# Patient Record
Sex: FEMALE | Race: ASIAN | NOT HISPANIC OR LATINO
[De-identification: names, ages, dates, MRNs, and addresses within clinical notes are randomized per-mention and may not be internally consistent; named-entity substitution may affect disease eponyms.]

---

## 2020-08-07 PROBLEM — Z00.00 ENCOUNTER FOR PREVENTIVE HEALTH EXAMINATION: Status: ACTIVE | Noted: 2020-08-07

## 2020-08-13 ENCOUNTER — APPOINTMENT (OUTPATIENT)
Dept: ANTEPARTUM | Facility: CLINIC | Age: 30
End: 2020-08-13
Payer: COMMERCIAL

## 2020-08-13 ENCOUNTER — ASOB RESULT (OUTPATIENT)
Age: 30
End: 2020-08-13

## 2020-08-13 PROCEDURE — 76811 OB US DETAILED SNGL FETUS: CPT

## 2020-08-13 PROCEDURE — 76817 TRANSVAGINAL US OBSTETRIC: CPT

## 2020-11-24 ENCOUNTER — APPOINTMENT (OUTPATIENT)
Dept: ULTRASOUND IMAGING | Facility: CLINIC | Age: 30
End: 2020-11-24

## 2020-11-24 ENCOUNTER — OUTPATIENT (OUTPATIENT)
Dept: OUTPATIENT SERVICES | Facility: HOSPITAL | Age: 30
LOS: 1 days | End: 2020-11-24
Payer: COMMERCIAL

## 2020-11-24 DIAGNOSIS — Z00.8 ENCOUNTER FOR OTHER GENERAL EXAMINATION: ICD-10-CM

## 2020-11-24 PROCEDURE — 93970 EXTREMITY STUDY: CPT

## 2020-11-24 PROCEDURE — 93970 EXTREMITY STUDY: CPT | Mod: 26

## 2020-11-26 ENCOUNTER — TRANSCRIPTION ENCOUNTER (OUTPATIENT)
Age: 30
End: 2020-11-26

## 2020-11-27 ENCOUNTER — INPATIENT (INPATIENT)
Facility: HOSPITAL | Age: 30
LOS: 4 days | Discharge: HOME | End: 2020-12-02
Attending: OBSTETRICS & GYNECOLOGY | Admitting: OBSTETRICS & GYNECOLOGY
Payer: COMMERCIAL

## 2020-11-27 VITALS
TEMPERATURE: 98 F | RESPIRATION RATE: 18 BRPM | SYSTOLIC BLOOD PRESSURE: 181 MMHG | DIASTOLIC BLOOD PRESSURE: 109 MMHG | HEART RATE: 58 BPM

## 2020-11-27 DIAGNOSIS — O26.899 OTHER SPECIFIED PREGNANCY RELATED CONDITIONS, UNSPECIFIED TRIMESTER: ICD-10-CM

## 2020-11-27 DIAGNOSIS — Z34.80 ENCOUNTER FOR SUPERVISION OF OTHER NORMAL PREGNANCY, UNSPECIFIED TRIMESTER: ICD-10-CM

## 2020-11-27 DIAGNOSIS — Z3A.00 WEEKS OF GESTATION OF PREGNANCY NOT SPECIFIED: ICD-10-CM

## 2020-11-27 DIAGNOSIS — Z98.890 OTHER SPECIFIED POSTPROCEDURAL STATES: Chronic | ICD-10-CM

## 2020-11-27 DIAGNOSIS — O03.9 COMPLETE OR UNSPECIFIED SPONTANEOUS ABORTION WITHOUT COMPLICATION: Chronic | ICD-10-CM

## 2020-11-27 LAB
ALBUMIN SERPL ELPH-MCNC: 2.7 G/DL — LOW (ref 3.3–5)
ALBUMIN SERPL ELPH-MCNC: 2.8 G/DL — LOW (ref 3.3–5)
ALP SERPL-CCNC: 148 U/L — HIGH (ref 40–120)
ALP SERPL-CCNC: 160 U/L — HIGH (ref 40–120)
ALT FLD-CCNC: 151 U/L — HIGH (ref 10–45)
ALT FLD-CCNC: 43 U/L — SIGNIFICANT CHANGE UP (ref 10–45)
ANION GAP SERPL CALC-SCNC: 11 MMOL/L — SIGNIFICANT CHANGE UP (ref 5–17)
ANION GAP SERPL CALC-SCNC: 14 MMOL/L — SIGNIFICANT CHANGE UP (ref 5–17)
APPEARANCE UR: ABNORMAL
APTT BLD: 31.6 SEC — SIGNIFICANT CHANGE UP (ref 27.5–35.5)
APTT BLD: 32.5 SEC — SIGNIFICANT CHANGE UP (ref 27.5–35.5)
AST SERPL-CCNC: 200 U/L — HIGH (ref 10–40)
AST SERPL-CCNC: 60 U/L — HIGH (ref 10–40)
BASOPHILS # BLD AUTO: 0.04 K/UL — SIGNIFICANT CHANGE UP (ref 0–0.2)
BASOPHILS # BLD AUTO: 0.04 K/UL — SIGNIFICANT CHANGE UP (ref 0–0.2)
BASOPHILS NFR BLD AUTO: 0.3 % — SIGNIFICANT CHANGE UP (ref 0–2)
BASOPHILS NFR BLD AUTO: 0.4 % — SIGNIFICANT CHANGE UP (ref 0–2)
BILIRUB SERPL-MCNC: 0.4 MG/DL — SIGNIFICANT CHANGE UP (ref 0.2–1.2)
BILIRUB SERPL-MCNC: 0.6 MG/DL — SIGNIFICANT CHANGE UP (ref 0.2–1.2)
BILIRUB UR-MCNC: NEGATIVE — SIGNIFICANT CHANGE UP
BLD GP AB SCN SERPL QL: NEGATIVE — SIGNIFICANT CHANGE UP
BUN SERPL-MCNC: 17 MG/DL — SIGNIFICANT CHANGE UP (ref 7–23)
BUN SERPL-MCNC: 17 MG/DL — SIGNIFICANT CHANGE UP (ref 7–23)
CALCIUM SERPL-MCNC: 8.5 MG/DL — SIGNIFICANT CHANGE UP (ref 8.4–10.5)
CALCIUM SERPL-MCNC: 8.9 MG/DL — SIGNIFICANT CHANGE UP (ref 8.4–10.5)
CHLORIDE SERPL-SCNC: 102 MMOL/L — SIGNIFICANT CHANGE UP (ref 96–108)
CHLORIDE SERPL-SCNC: 103 MMOL/L — SIGNIFICANT CHANGE UP (ref 96–108)
CO2 SERPL-SCNC: 19 MMOL/L — LOW (ref 22–31)
CO2 SERPL-SCNC: 20 MMOL/L — LOW (ref 22–31)
COLOR SPEC: YELLOW — SIGNIFICANT CHANGE UP
CREAT ?TM UR-MCNC: 128 MG/DL — SIGNIFICANT CHANGE UP
CREAT SERPL-MCNC: 0.79 MG/DL — SIGNIFICANT CHANGE UP (ref 0.5–1.3)
CREAT SERPL-MCNC: 0.85 MG/DL — SIGNIFICANT CHANGE UP (ref 0.5–1.3)
DIFF PNL FLD: ABNORMAL
EOSINOPHIL # BLD AUTO: 0.04 K/UL — SIGNIFICANT CHANGE UP (ref 0–0.5)
EOSINOPHIL # BLD AUTO: 0.05 K/UL — SIGNIFICANT CHANGE UP (ref 0–0.5)
EOSINOPHIL NFR BLD AUTO: 0.4 % — SIGNIFICANT CHANGE UP (ref 0–6)
EOSINOPHIL NFR BLD AUTO: 0.4 % — SIGNIFICANT CHANGE UP (ref 0–6)
FIBRINOGEN PPP-MCNC: 631 MG/DL — HIGH (ref 290–520)
FIBRINOGEN PPP-MCNC: 822 MG/DL — HIGH (ref 290–520)
GLUCOSE SERPL-MCNC: 108 MG/DL — HIGH (ref 70–99)
GLUCOSE SERPL-MCNC: 75 MG/DL — SIGNIFICANT CHANGE UP (ref 70–99)
GLUCOSE UR QL: NEGATIVE — SIGNIFICANT CHANGE UP
HCT VFR BLD CALC: 43.2 % — SIGNIFICANT CHANGE UP (ref 34.5–45)
HCT VFR BLD CALC: 46.1 % — HIGH (ref 34.5–45)
HGB BLD-MCNC: 14.3 G/DL — SIGNIFICANT CHANGE UP (ref 11.5–15.5)
HGB BLD-MCNC: 15.7 G/DL — HIGH (ref 11.5–15.5)
IMM GRANULOCYTES NFR BLD AUTO: 0.4 % — SIGNIFICANT CHANGE UP (ref 0–1.5)
IMM GRANULOCYTES NFR BLD AUTO: 0.6 % — SIGNIFICANT CHANGE UP (ref 0–1.5)
INR BLD: 0.75 RATIO — LOW (ref 0.88–1.16)
INR BLD: 0.75 RATIO — LOW (ref 0.88–1.16)
KETONES UR-MCNC: NEGATIVE — SIGNIFICANT CHANGE UP
LDH SERPL L TO P-CCNC: 436 U/L — HIGH (ref 50–242)
LDH SERPL L TO P-CCNC: 534 U/L — HIGH (ref 50–242)
LEUKOCYTE ESTERASE UR-ACNC: NEGATIVE — SIGNIFICANT CHANGE UP
LYMPHOCYTES # BLD AUTO: 1.54 K/UL — SIGNIFICANT CHANGE UP (ref 1–3.3)
LYMPHOCYTES # BLD AUTO: 12.2 % — LOW (ref 13–44)
LYMPHOCYTES # BLD AUTO: 3.46 K/UL — HIGH (ref 1–3.3)
LYMPHOCYTES # BLD AUTO: 33 % — SIGNIFICANT CHANGE UP (ref 13–44)
MAGNESIUM SERPL-MCNC: 5 MG/DL — HIGH (ref 1.6–2.6)
MCHC RBC-ENTMCNC: 31.8 PG — SIGNIFICANT CHANGE UP (ref 27–34)
MCHC RBC-ENTMCNC: 32.7 PG — SIGNIFICANT CHANGE UP (ref 27–34)
MCHC RBC-ENTMCNC: 33.1 GM/DL — SIGNIFICANT CHANGE UP (ref 32–36)
MCHC RBC-ENTMCNC: 34.1 GM/DL — SIGNIFICANT CHANGE UP (ref 32–36)
MCV RBC AUTO: 96 FL — SIGNIFICANT CHANGE UP (ref 80–100)
MCV RBC AUTO: 96.2 FL — SIGNIFICANT CHANGE UP (ref 80–100)
MONOCYTES # BLD AUTO: 0.22 K/UL — SIGNIFICANT CHANGE UP (ref 0–0.9)
MONOCYTES # BLD AUTO: 0.75 K/UL — SIGNIFICANT CHANGE UP (ref 0–0.9)
MONOCYTES NFR BLD AUTO: 1.7 % — LOW (ref 2–14)
MONOCYTES NFR BLD AUTO: 7.1 % — SIGNIFICANT CHANGE UP (ref 2–14)
NEUTROPHILS # BLD AUTO: 10.75 K/UL — HIGH (ref 1.8–7.4)
NEUTROPHILS # BLD AUTO: 6.16 K/UL — SIGNIFICANT CHANGE UP (ref 1.8–7.4)
NEUTROPHILS NFR BLD AUTO: 58.7 % — SIGNIFICANT CHANGE UP (ref 43–77)
NEUTROPHILS NFR BLD AUTO: 84.8 % — HIGH (ref 43–77)
NITRITE UR-MCNC: NEGATIVE — SIGNIFICANT CHANGE UP
NRBC # BLD: 0 /100 WBCS — SIGNIFICANT CHANGE UP (ref 0–0)
NRBC # BLD: 0 /100 WBCS — SIGNIFICANT CHANGE UP (ref 0–0)
PH UR: 6.5 — SIGNIFICANT CHANGE UP (ref 5–8)
PLATELET # BLD AUTO: 108 K/UL — LOW (ref 150–400)
PLATELET # BLD AUTO: 156 K/UL — SIGNIFICANT CHANGE UP (ref 150–400)
POTASSIUM SERPL-MCNC: 4.9 MMOL/L — SIGNIFICANT CHANGE UP (ref 3.5–5.3)
POTASSIUM SERPL-MCNC: 4.9 MMOL/L — SIGNIFICANT CHANGE UP (ref 3.5–5.3)
POTASSIUM SERPL-SCNC: 4.9 MMOL/L — SIGNIFICANT CHANGE UP (ref 3.5–5.3)
POTASSIUM SERPL-SCNC: 4.9 MMOL/L — SIGNIFICANT CHANGE UP (ref 3.5–5.3)
PROT ?TM UR-MCNC: >2000 MG/DL — HIGH (ref 0–12)
PROT SERPL-MCNC: 5.6 G/DL — LOW (ref 6–8.3)
PROT SERPL-MCNC: 6.3 G/DL — SIGNIFICANT CHANGE UP (ref 6–8.3)
PROT UR-MCNC: >600
PROT/CREAT UR-RTO: SIGNIFICANT CHANGE UP (ref 0–0.2)
PROTHROM AB SERPL-ACNC: 9.1 SEC — LOW (ref 10.6–13.6)
PROTHROM AB SERPL-ACNC: 9.1 SEC — LOW (ref 10.6–13.6)
RBC # BLD: 4.49 M/UL — SIGNIFICANT CHANGE UP (ref 3.8–5.2)
RBC # BLD: 4.8 M/UL — SIGNIFICANT CHANGE UP (ref 3.8–5.2)
RBC # FLD: 14.2 % — SIGNIFICANT CHANGE UP (ref 10.3–14.5)
RBC # FLD: 14.3 % — SIGNIFICANT CHANGE UP (ref 10.3–14.5)
RH IG SCN BLD-IMP: POSITIVE — SIGNIFICANT CHANGE UP
SARS-COV-2 IGG SERPL QL IA: NEGATIVE — SIGNIFICANT CHANGE UP
SARS-COV-2 IGM SERPL IA-ACNC: <0.1 INDEX — SIGNIFICANT CHANGE UP
SARS-COV-2 RNA SPEC QL NAA+PROBE: SIGNIFICANT CHANGE UP
SODIUM SERPL-SCNC: 133 MMOL/L — LOW (ref 135–145)
SODIUM SERPL-SCNC: 136 MMOL/L — SIGNIFICANT CHANGE UP (ref 135–145)
SP GR SPEC: 1.03 — HIGH (ref 1.01–1.02)
T PALLIDUM AB TITR SER: NEGATIVE — SIGNIFICANT CHANGE UP
URATE SERPL-MCNC: 7.4 MG/DL — HIGH (ref 2.5–7)
URATE SERPL-MCNC: 7.5 MG/DL — HIGH (ref 2.5–7)
UROBILINOGEN FLD QL: NEGATIVE — SIGNIFICANT CHANGE UP
WBC # BLD: 10.49 K/UL — SIGNIFICANT CHANGE UP (ref 3.8–10.5)
WBC # BLD: 12.67 K/UL — HIGH (ref 3.8–10.5)
WBC # FLD AUTO: 10.49 K/UL — SIGNIFICANT CHANGE UP (ref 3.8–10.5)
WBC # FLD AUTO: 12.67 K/UL — HIGH (ref 3.8–10.5)

## 2020-11-27 PROCEDURE — 99233 SBSQ HOSP IP/OBS HIGH 50: CPT

## 2020-11-27 PROCEDURE — 88307 TISSUE EXAM BY PATHOLOGIST: CPT | Mod: 26

## 2020-11-27 RX ORDER — HYDRALAZINE HCL 50 MG
10 TABLET ORAL ONCE
Refills: 0 | Status: COMPLETED | OUTPATIENT
Start: 2020-11-27 | End: 2020-11-27

## 2020-11-27 RX ORDER — FERROUS SULFATE 325(65) MG
325 TABLET ORAL DAILY
Refills: 0 | Status: DISCONTINUED | OUTPATIENT
Start: 2020-11-27 | End: 2020-12-02

## 2020-11-27 RX ORDER — HYDRALAZINE HCL 50 MG
10 TABLET ORAL ONCE
Refills: 0 | Status: DISCONTINUED | OUTPATIENT
Start: 2020-11-27 | End: 2020-11-28

## 2020-11-27 RX ORDER — LABETALOL HCL 100 MG
20 TABLET ORAL ONCE
Refills: 0 | Status: COMPLETED | OUTPATIENT
Start: 2020-11-27 | End: 2020-11-27

## 2020-11-27 RX ORDER — MAGNESIUM SULFATE 500 MG/ML
2 VIAL (ML) INJECTION
Qty: 40 | Refills: 0 | Status: DISCONTINUED | OUTPATIENT
Start: 2020-11-27 | End: 2020-11-27

## 2020-11-27 RX ORDER — OXYCODONE HYDROCHLORIDE 5 MG/1
5 TABLET ORAL ONCE
Refills: 0 | Status: DISCONTINUED | OUTPATIENT
Start: 2020-11-27 | End: 2020-11-27

## 2020-11-27 RX ORDER — LABETALOL HCL 100 MG
300 TABLET ORAL THREE TIMES A DAY
Refills: 0 | Status: DISCONTINUED | OUTPATIENT
Start: 2020-11-27 | End: 2020-11-27

## 2020-11-27 RX ORDER — OXYTOCIN 10 UNIT/ML
333.33 VIAL (ML) INJECTION
Qty: 20 | Refills: 0 | Status: DISCONTINUED | OUTPATIENT
Start: 2020-11-27 | End: 2020-11-27

## 2020-11-27 RX ORDER — OXYCODONE HYDROCHLORIDE 5 MG/1
5 TABLET ORAL
Refills: 0 | Status: DISCONTINUED | OUTPATIENT
Start: 2020-11-27 | End: 2020-11-27

## 2020-11-27 RX ORDER — SIMETHICONE 80 MG/1
80 TABLET, CHEWABLE ORAL EVERY 4 HOURS
Refills: 0 | Status: DISCONTINUED | OUTPATIENT
Start: 2020-11-27 | End: 2020-12-02

## 2020-11-27 RX ORDER — CITRIC ACID/SODIUM CITRATE 300-500 MG
15 SOLUTION, ORAL ORAL EVERY 6 HOURS
Refills: 0 | Status: DISCONTINUED | OUTPATIENT
Start: 2020-11-27 | End: 2020-11-27

## 2020-11-27 RX ORDER — SODIUM CHLORIDE 9 MG/ML
1000 INJECTION, SOLUTION INTRAVENOUS
Refills: 0 | Status: DISCONTINUED | OUTPATIENT
Start: 2020-11-27 | End: 2020-11-27

## 2020-11-27 RX ORDER — ONDANSETRON 8 MG/1
4 TABLET, FILM COATED ORAL ONCE
Refills: 0 | Status: COMPLETED | OUTPATIENT
Start: 2020-11-27 | End: 2020-11-27

## 2020-11-27 RX ORDER — TETANUS TOXOID, REDUCED DIPHTHERIA TOXOID AND ACELLULAR PERTUSSIS VACCINE, ADSORBED 5; 2.5; 8; 8; 2.5 [IU]/.5ML; [IU]/.5ML; UG/.5ML; UG/.5ML; UG/.5ML
0.5 SUSPENSION INTRAMUSCULAR ONCE
Refills: 0 | Status: DISCONTINUED | OUTPATIENT
Start: 2020-11-27 | End: 2020-11-27

## 2020-11-27 RX ORDER — CHLORHEXIDINE GLUCONATE 213 G/1000ML
1 SOLUTION TOPICAL
Refills: 0 | Status: DISCONTINUED | OUTPATIENT
Start: 2020-11-28 | End: 2020-11-28

## 2020-11-27 RX ORDER — SODIUM CHLORIDE 9 MG/ML
1000 INJECTION, SOLUTION INTRAVENOUS
Refills: 0 | Status: DISCONTINUED | OUTPATIENT
Start: 2020-11-27 | End: 2020-11-28

## 2020-11-27 RX ORDER — MORPHINE SULFATE 50 MG/1
2 CAPSULE, EXTENDED RELEASE ORAL ONCE
Refills: 0 | Status: DISCONTINUED | OUTPATIENT
Start: 2020-11-27 | End: 2020-11-27

## 2020-11-27 RX ORDER — LABETALOL HCL 100 MG
40 TABLET ORAL ONCE
Refills: 0 | Status: COMPLETED | OUTPATIENT
Start: 2020-11-27 | End: 2020-11-27

## 2020-11-27 RX ORDER — MAGNESIUM SULFATE 500 MG/ML
4 VIAL (ML) INJECTION ONCE
Refills: 0 | Status: DISCONTINUED | OUTPATIENT
Start: 2020-11-27 | End: 2020-11-27

## 2020-11-27 RX ORDER — DIPHENHYDRAMINE HCL 50 MG
25 CAPSULE ORAL EVERY 6 HOURS
Refills: 0 | Status: DISCONTINUED | OUTPATIENT
Start: 2020-11-27 | End: 2020-11-27

## 2020-11-27 RX ORDER — NIFEDIPINE 30 MG
10 TABLET, EXTENDED RELEASE 24 HR ORAL ONCE
Refills: 0 | Status: COMPLETED | OUTPATIENT
Start: 2020-11-27 | End: 2020-11-27

## 2020-11-27 RX ORDER — NIFEDIPINE 30 MG
60 TABLET, EXTENDED RELEASE 24 HR ORAL DAILY
Refills: 0 | Status: DISCONTINUED | OUTPATIENT
Start: 2020-11-27 | End: 2020-11-28

## 2020-11-27 RX ORDER — FOLIC ACID 0.8 MG
1 TABLET ORAL DAILY
Refills: 0 | Status: DISCONTINUED | OUTPATIENT
Start: 2020-11-27 | End: 2020-11-27

## 2020-11-27 RX ORDER — MAGNESIUM SULFATE 500 MG/ML
4 VIAL (ML) INJECTION ONCE
Refills: 0 | Status: COMPLETED | OUTPATIENT
Start: 2020-11-27 | End: 2020-11-27

## 2020-11-27 RX ORDER — NALOXONE HYDROCHLORIDE 4 MG/.1ML
0.1 SPRAY NASAL
Refills: 0 | Status: DISCONTINUED | OUTPATIENT
Start: 2020-11-27 | End: 2020-12-02

## 2020-11-27 RX ORDER — ONDANSETRON 8 MG/1
4 TABLET, FILM COATED ORAL EVERY 6 HOURS
Refills: 0 | Status: DISCONTINUED | OUTPATIENT
Start: 2020-11-27 | End: 2020-11-27

## 2020-11-27 RX ORDER — LABETALOL HCL 100 MG
80 TABLET ORAL ONCE
Refills: 0 | Status: COMPLETED | OUTPATIENT
Start: 2020-11-27 | End: 2020-11-27

## 2020-11-27 RX ORDER — MAGNESIUM HYDROXIDE 400 MG/1
30 TABLET, CHEWABLE ORAL
Refills: 0 | Status: DISCONTINUED | OUTPATIENT
Start: 2020-11-27 | End: 2020-12-02

## 2020-11-27 RX ORDER — ALPRAZOLAM 0.25 MG
0.5 TABLET ORAL ONCE
Refills: 0 | Status: DISCONTINUED | OUTPATIENT
Start: 2020-11-27 | End: 2020-11-27

## 2020-11-27 RX ORDER — LANOLIN
1 OINTMENT (GRAM) TOPICAL EVERY 6 HOURS
Refills: 0 | Status: DISCONTINUED | OUTPATIENT
Start: 2020-11-27 | End: 2020-12-02

## 2020-11-27 RX ADMIN — Medication 12 MILLIGRAM(S): at 15:33

## 2020-11-27 RX ADMIN — Medication 200 GRAM(S): at 14:22

## 2020-11-27 RX ADMIN — Medication 80 MILLIGRAM(S): at 16:48

## 2020-11-27 RX ADMIN — Medication 10 MILLIGRAM(S): at 13:56

## 2020-11-27 RX ADMIN — Medication 300 MILLIGRAM(S): at 15:33

## 2020-11-27 RX ADMIN — Medication 20 MILLIGRAM(S): at 14:24

## 2020-11-27 RX ADMIN — Medication 50 GM/HR: at 14:52

## 2020-11-27 RX ADMIN — SODIUM CHLORIDE 50 MILLILITER(S): 9 INJECTION, SOLUTION INTRAVENOUS at 14:14

## 2020-11-27 RX ADMIN — Medication 10 MILLIGRAM(S): at 22:32

## 2020-11-27 RX ADMIN — Medication 10 MILLIGRAM(S): at 13:39

## 2020-11-27 RX ADMIN — ONDANSETRON 4 MILLIGRAM(S): 8 TABLET, FILM COATED ORAL at 17:00

## 2020-11-27 RX ADMIN — Medication 40 MILLIGRAM(S): at 15:27

## 2020-11-27 RX ADMIN — Medication 80 MILLIGRAM(S): at 15:53

## 2020-11-27 NOTE — OB PROVIDER H&P - ATTENDING COMMENTS
Patient is a P0 at 35w3d presenting with severe range BPs and meeting criteria for Pre-eclampsia with severe features.  P:C ratio in the office 2 weeks ago was 9. Patient also edematous.  After multiple attempts at obtaining IV, IV was inserted. In the interim, patient received Procardia PO 10 and 20MG. After IV was inserted, patient also received Labetalol 20 IVP. Will start Magnesium and Labetalol 300 TID. Will plan for IOL with PO cytotec and CB. Discussed risks of  if pressures continue to be uncontrolled.    All RBAS discussed    huy muniz

## 2020-11-27 NOTE — CHART NOTE - NSCHARTNOTEFT_GEN_A_CORE
Patient's BPs now controlled after multiple pushes of IV labetalol. Cat 1 tracing. Patient now getting Magnesium for seizure ppx.    Will induce with PO cytotec and CB  huy muniz Patient's BPs now controlled after multiple pushes of IV labetalol. Cat 1 tracing. Patient now getting Magnesium for seizure ppx.    Reviewed HELLP Labs               14.3   12.67 )-----------( 108      (  @ 18:32 )             43.2      18:32    133  |  102  |  17  ----------------------------<  108  4.9   |  20  |  0.85    Ca    8.5       @ 18:32  Mg     5.0      @ 18:32    TPro  5.6  /  Alb  2.7  /  TBili  0.6  /  DBili  x   /  AST  200  /  ALT  151  /  AlkPhos  148   @ 18:32    PT/INR - (  @ 18:32 )   PT: 9.1 sec;   INR: 0.75 ratio    PTT - (  @ 18:32 )  PTT:31.6 sec    Uric Acid: ( @ 18:32)  7.5      Fibrinogen: ( @ 18:32)  631      LDH: ( @ 18:32)  534        Given rapidely rising LFTS and dropping plts, plan for .   Discussed RBAS with patient and her   Given high hemorrhage risk will have hemorrhage kit in room and prophylactically given TXA    huy muniz

## 2020-11-27 NOTE — OB RN DELIVERY SUMMARY - NS_SEPSISRSKCALC_OBGYN_ALL_OB_FT
Rupture of membranes must be entered above.  GBS status in the 'Prenatal Lab tests/results section' on the OB RN Patient Profile must be documented.

## 2020-11-27 NOTE — OB PROVIDER H&P - HISTORY OF PRESENT ILLNESS
29yo P0 @ 35w 3 d presents from the office with elevated BP for R/O PEC, pt with new onset elevated BP in the office today 150/90.  Pt denies HA, visual changes or epigastric/RUQ pain.  Pt does states that she has alot of LE edema and also swelling of her abdomen and was told she had fluid on ultrasound outside the uterus.  Pt denies contractions, VB or LOF has + FM    PNC: Dr Michelle  PNI: uncomplicated  PNL: GBS ?    All: NKDA  Meds: PNV  PMHx: Denies   PSHx: Denies  Socialhx: denies x 3 ( quit tobacco prior o pregnancy)  OBhx: 2014 1st TOP --> D & C  GYNhx: Denies fibroids , ov cyst or STDs      HR: 63 (11-27-20 @ 13:39) (58 - 66)  BP: 167/114 (11-27-20 @ 13:24) (167/114 - 181/109)  SpO2: 100% (11-27-20 @ 13:39) (98% - 100%)    Gen: NAD  Heart: RRR  LUngs: CT A 29yo  @ 35w 3 d presents from the office with elevated BP for R/O PEC, pt with new onset elevated BP in the office today 150/90.  Pt denies HA, visual changes or epigastric/RUQ pain.  Pt does states that she has alot of LE edema and also swelling of her abdomen and was told she had fluid on ultrasound outside the uterus.  Pt denies contractions, VB or LOF has + FM    PNC: Dr Michelle  PNI: uncomplicated  PNL: GBS ?, PC/C ratio 9.2 ( 11/21)  Prenatal US: 11/20  EFW 3lbs 15 oz ( 2%), AC <1%, KAYLI 10  11/7: vtx KAYLI 13  All: NKDA  Meds: PNV  PMHx: Denies   PSHx: D&C  Socialhx: denies x 3 ( quit tobacco prior o pregnancy)  OBhx: 2014 1st TOP --> D & C  GYNhx: Denies fibroids , ov cyst or STDs      HR: 63 (11-27-20 @ 13:39) (58 - 66)  BP: 167/114 (11-27-20 @ 13:24) (167/114 - 181/109)  SpO2: 100% (11-27-20 @ 13:39) (98% - 100%)    Gen: NAD  Heart: RRR  Lungs: CTA B/L  Abdomen: Gravid, NT, + swelling of lower abdomen  Ext: 3 + b/l LE edema, 3+ brisks reflexes    NST: 140's moderate variability + accels no decels  TOCO: none  VE:  EFW: 3lbs 15 oz 11/20  BSUS: vtx in office today 29yo  @ 35w 3 d presents from the office with elevated BP for R/O PEC, pt with new onset elevated BP in the office today 150/90.  Pt denies HA, visual changes or epigastric/RUQ pain.  Pt does states that she has alot of LE edema and also swelling of her abdomen and was told she had fluid on ultrasound outside the uterus.  Pt denies contractions, VB or LOF has + FM  15lb weight gain in < 1 week    PNC: Dr Michelle  PNI: uncomplicated  PNL: GBS ?, PC/C ratio 9.2 ( 11/21)  Prenatal US: 11/20  EFW 3lbs 15 oz ( 2%), AC <1%, KAYLI 10  11/7: vtx KAYLI 13  All: NKDA  Meds: PNV  PMHx: Denies   PSHx: D&C  Socialhx: denies x 3 ( quit tobacco prior o pregnancy)  OBhx: 2014 1st TOP --> D & C  GYNhx: Denies fibroids , ov cyst or STDs      HR: 63 (11-27-20 @ 13:39) (58 - 66)  BP: 167/114 (11-27-20 @ 13:24) (167/114 - 181/109)  SpO2: 100% (11-27-20 @ 13:39) (98% - 100%)    Gen: NAD  Heart: RRR  Lungs: CTA B/L  Abdomen: Gravid, NT, + swelling of lower abdomen  Ext: 3 + b/l LE edema, 3+ brisks reflexes    NST: 140's moderate variability + accels no decels  TOCO: none  VE:deferred  EFW: 3lbs 15 oz 11/20  BSUS: vtx in office today

## 2020-11-27 NOTE — OB PROVIDER H&P - ASSESSMENT
29yo  @ 29yo  @ 35w 3 d admitted with sPEC  - Admit to L & D/labs/IVF/NPO  - Fetal status - cat 1  - GBS ?  - Pain control as needed  - HELLP labs ordered  - Betamethasone ordered  - Magnesium for seizure prophylaxis ordered  - Unable to get IV access Nifedipeine ordered PO   D/W Dr Michelle who is at the bedside evaluating patient with me    Carmen ORTEGA_LENORE

## 2020-11-27 NOTE — CHART NOTE - NSCHARTNOTEFT_GEN_A_CORE
R4 OB Chart Note    29yo  at 35w3d presented with elevated BPs and proteinuria, found to be severe PEC in triage. Patient required Procardia 10IR x2 prior to IV and subsequently received labetalol 20, 40, 80 and 80mg of IVP medication for persistent severe range BPs. She also received Labetalol 300mg PO x1. HELLP labs on arrival were stable, however, repeat labs at 1812 showed platelets downtrending to 108 and AST/ALT rising to 200/151. Decision made to proceed w/ C/S for concern for developing HELLP syndrome.     C/S uncomplicated, EBL 800cc, IVF 2400cc, UOP 75cc (concentrated). Patient given TXA prophylactically at start of procedure. At end of procedure, patient's BPs noted to be 160s/100s and anesthesia administered cardene. Patient arrived to PACU stable with BPs 150s/90s via left A-line placed intraop. Patient began to have severe range BPs again and hydralazine 10mg IVP given x1. SICU consult called due to patient's continued severe range BPs requiring higher level of care. Patient ordered for Procardia 60XL to be started as standing medication with labetalol 300mg d/c in setting of asthma history. Epidural remains in place, as would avoid toradol and tylenol due to asthma and PPH risk, along with rising LFTs. Will plan for repeat HELLP labs at 1230a.     Patient and  aware of plan and all questions addressed.     Dr. Miguel Angel farley   L Kramer PGY4

## 2020-11-27 NOTE — OB NEONATOLOGY/PEDIATRICIAN DELIVERY SUMMARY - NSPEDSNEONOTESA_OBGYN_ALL_OB_FT
Baby Demond is a 35+2 wk GA male born to a 31 y/o  mother via C/S for preeclampsia on magnesium. Maternal history of asthma as a child. Prenatal history complicated by high BP and elevated liver enzymes. Magnesium level is 5.0. Maternal BT o+. PNL neg, NR, and immune. GBS unknown. ROM at c/s, clear fluids. Baby born vigorous and crying spontaneously. WDSS. Apgars 8/9. EOS 0.16. Mom plans to breastfeed, would like hepB and circ . COVID status negative. Baby’s temp in delivery room: 36.2. Was transferred to NICU on RA.

## 2020-11-27 NOTE — OB PROVIDER IHI INDUCTION/AUGMENTATION NOTE - LABOR: CERVICAL CONSISTENCY
Relevant Problems   No relevant active problems       Anesthetic History     PONV          Review of Systems / Medical History  Patient summary reviewed, nursing notes reviewed and pertinent labs reviewed    Pulmonary                   Neuro/Psych              Cardiovascular    Hypertension: well controlled              Exercise tolerance: >4 METS     GI/Hepatic/Renal     GERD: well controlled    Renal disease: CRI       Endo/Other      Hypothyroidism: well controlled  Obesity, arthritis and cancer (breast)     Other Findings              Physical Exam    Airway  Mallampati: II  TM Distance: 4 - 6 cm  Neck ROM: normal range of motion   Mouth opening: Normal     Cardiovascular  Regular rate and rhythm,  S1 and S2 normal,  no murmur, click, rub, or gallop             Dental    Dentition: Full lower dentures and Full upper dentures     Pulmonary  Breath sounds clear to auscultation               Abdominal         Other Findings            Anesthetic Plan    ASA: 2  Anesthesia type: total IV anesthesia          Induction: Intravenous  Anesthetic plan and risks discussed with: Patient medium

## 2020-11-27 NOTE — OB PROVIDER LABOR PROGRESS NOTE - ASSESSMENT
31yo P0 @ 35w 3 d admitted for sPEC, with severe range BPs    Pt is now s/p multiple doses of medication to try and control BP's  Nifedipine 10mg PO--> repeat /114  NIfedipine 20mg PO --> repeat /97  Labetalol 20mg IVP --> repeat /83, 183/104  Labetalol 40mg IVP --> repeat /99  Labetalol 80mg IVP --> anestheisa consulted, MFM consulted  Labetalol 300mg PO -->repeat /91, epidural placed -->178/106  Labetalol 80mg IVP --> 151/95, 138/87    BP's now controlled  Pt sleeping in the bed and asyptomatic  Plan is to continue magnesium for seizure prophylaxis  Will continue to monitor BPs and urine output  Will send repeat HELLP labs, will collect 24 hour urine  Will do cervical exam to determine induction agent  D/W Dr Miguel Angel Peña PA-C

## 2020-11-27 NOTE — OB RN INTRAOPERATIVE NOTE - NS_DELIVERYRN_OBGYN_ALL_OB_FT
Problem: Infection  Goal: Will remain free from infection    Intervention: Assess signs and symptoms of infection  Patient at risk for infection due to invasive procedures. Given teaching on importance of hand hygiene and infection prevention techniques.       Problem: Knowledge Deficit  Goal: Knowledge of disease process/condition, treatment plan, diagnostic tests, and medications will improve    Intervention: Assess knowledge level of disease process/condition, treatment plan, diagnostic tests, and medications  Plan of care explained to patient along with disease process and complications. Patient refused teaching          Lor DIAZ

## 2020-11-27 NOTE — OB PROVIDER H&P - NSHPPHYSICALEXAM_GEN_ALL_CORE
Gen: NAD  Heart: RRR  Lungs: CTA B/L  Abdomen: Gravid, NT, + swelling of lower abdomen  Ext: 3 + b/l LE edema, 3+ brisks reflexes

## 2020-11-27 NOTE — OB PROVIDER DELIVERY SUMMARY - NSPROVIDERDELIVERYNOTE_OBGYN_ALL_OB_FT
Liveborn infant  Normal appearing uterus, bilateral tubes and ovaries  Placenta was small with multiple areas of white plaques  Uterus closed in 2 layers  Intercede placed over hysterotomy

## 2020-11-28 LAB
ALBUMIN SERPL ELPH-MCNC: 2.1 G/DL — LOW (ref 3.3–5)
ALBUMIN SERPL ELPH-MCNC: 2.3 G/DL — LOW (ref 3.3–5)
ALBUMIN SERPL ELPH-MCNC: 2.3 G/DL — LOW (ref 3.3–5)
ALBUMIN SERPL ELPH-MCNC: 2.5 G/DL — LOW (ref 3.3–5)
ALBUMIN SERPL ELPH-MCNC: 2.5 G/DL — LOW (ref 3.3–5)
ALP SERPL-CCNC: 107 U/L — SIGNIFICANT CHANGE UP (ref 40–120)
ALP SERPL-CCNC: 116 U/L — SIGNIFICANT CHANGE UP (ref 40–120)
ALP SERPL-CCNC: 119 U/L — SIGNIFICANT CHANGE UP (ref 40–120)
ALP SERPL-CCNC: 133 U/L — HIGH (ref 40–120)
ALP SERPL-CCNC: 138 U/L — HIGH (ref 40–120)
ALT FLD-CCNC: 120 U/L — HIGH (ref 10–45)
ALT FLD-CCNC: 133 U/L — HIGH (ref 10–45)
ALT FLD-CCNC: 77 U/L — HIGH (ref 10–45)
ALT FLD-CCNC: 92 U/L — HIGH (ref 10–45)
ALT FLD-CCNC: 96 U/L — HIGH (ref 10–45)
ANION GAP SERPL CALC-SCNC: 10 MMOL/L — SIGNIFICANT CHANGE UP (ref 5–17)
ANION GAP SERPL CALC-SCNC: 13 MMOL/L — SIGNIFICANT CHANGE UP (ref 5–17)
ANION GAP SERPL CALC-SCNC: 14 MMOL/L — SIGNIFICANT CHANGE UP (ref 5–17)
ANION GAP SERPL CALC-SCNC: 15 MMOL/L — SIGNIFICANT CHANGE UP (ref 5–17)
ANION GAP SERPL CALC-SCNC: 15 MMOL/L — SIGNIFICANT CHANGE UP (ref 5–17)
APTT BLD: 30.4 SEC — SIGNIFICANT CHANGE UP (ref 27.5–35.5)
AST SERPL-CCNC: 107 U/L — HIGH (ref 10–40)
AST SERPL-CCNC: 131 U/L — HIGH (ref 10–40)
AST SERPL-CCNC: 150 U/L — HIGH (ref 10–40)
AST SERPL-CCNC: 197 U/L — HIGH (ref 10–40)
AST SERPL-CCNC: 215 U/L — HIGH (ref 10–40)
BILIRUB SERPL-MCNC: 0.5 MG/DL — SIGNIFICANT CHANGE UP (ref 0.2–1.2)
BILIRUB SERPL-MCNC: 0.6 MG/DL — SIGNIFICANT CHANGE UP (ref 0.2–1.2)
BUN SERPL-MCNC: 18 MG/DL — SIGNIFICANT CHANGE UP (ref 7–23)
BUN SERPL-MCNC: 20 MG/DL — SIGNIFICANT CHANGE UP (ref 7–23)
BUN SERPL-MCNC: 20 MG/DL — SIGNIFICANT CHANGE UP (ref 7–23)
CALCIUM SERPL-MCNC: 7.6 MG/DL — LOW (ref 8.4–10.5)
CALCIUM SERPL-MCNC: 7.9 MG/DL — LOW (ref 8.4–10.5)
CALCIUM SERPL-MCNC: 7.9 MG/DL — LOW (ref 8.4–10.5)
CALCIUM SERPL-MCNC: 8.1 MG/DL — LOW (ref 8.4–10.5)
CALCIUM SERPL-MCNC: 8.7 MG/DL — SIGNIFICANT CHANGE UP (ref 8.4–10.5)
CHLORIDE SERPL-SCNC: 101 MMOL/L — SIGNIFICANT CHANGE UP (ref 96–108)
CHLORIDE SERPL-SCNC: 101 MMOL/L — SIGNIFICANT CHANGE UP (ref 96–108)
CHLORIDE SERPL-SCNC: 102 MMOL/L — SIGNIFICANT CHANGE UP (ref 96–108)
CHLORIDE SERPL-SCNC: 102 MMOL/L — SIGNIFICANT CHANGE UP (ref 96–108)
CHLORIDE SERPL-SCNC: 103 MMOL/L — SIGNIFICANT CHANGE UP (ref 96–108)
CO2 SERPL-SCNC: 15 MMOL/L — LOW (ref 22–31)
CO2 SERPL-SCNC: 15 MMOL/L — LOW (ref 22–31)
CO2 SERPL-SCNC: 17 MMOL/L — LOW (ref 22–31)
CO2 SERPL-SCNC: 18 MMOL/L — LOW (ref 22–31)
CO2 SERPL-SCNC: 19 MMOL/L — LOW (ref 22–31)
CREAT SERPL-MCNC: 0.77 MG/DL — SIGNIFICANT CHANGE UP (ref 0.5–1.3)
CREAT SERPL-MCNC: 0.83 MG/DL — SIGNIFICANT CHANGE UP (ref 0.5–1.3)
CREAT SERPL-MCNC: 0.84 MG/DL — SIGNIFICANT CHANGE UP (ref 0.5–1.3)
CREAT SERPL-MCNC: 0.87 MG/DL — SIGNIFICANT CHANGE UP (ref 0.5–1.3)
CREAT SERPL-MCNC: 0.88 MG/DL — SIGNIFICANT CHANGE UP (ref 0.5–1.3)
FIBRINOGEN PPP-MCNC: 617 MG/DL — HIGH (ref 290–520)
GAS PNL BLDA: SIGNIFICANT CHANGE UP
GLUCOSE SERPL-MCNC: 121 MG/DL — HIGH (ref 70–99)
GLUCOSE SERPL-MCNC: 128 MG/DL — HIGH (ref 70–99)
GLUCOSE SERPL-MCNC: 133 MG/DL — HIGH (ref 70–99)
GLUCOSE SERPL-MCNC: 134 MG/DL — HIGH (ref 70–99)
GLUCOSE SERPL-MCNC: 140 MG/DL — HIGH (ref 70–99)
HCT VFR BLD CALC: 34.8 % — SIGNIFICANT CHANGE UP (ref 34.5–45)
HCT VFR BLD CALC: 36.5 % — SIGNIFICANT CHANGE UP (ref 34.5–45)
HCT VFR BLD CALC: 37.4 % — SIGNIFICANT CHANGE UP (ref 34.5–45)
HCT VFR BLD CALC: 41.7 % — SIGNIFICANT CHANGE UP (ref 34.5–45)
HCT VFR BLD CALC: 41.7 % — SIGNIFICANT CHANGE UP (ref 34.5–45)
HGB BLD-MCNC: 11.8 G/DL — SIGNIFICANT CHANGE UP (ref 11.5–15.5)
HGB BLD-MCNC: 12.8 G/DL — SIGNIFICANT CHANGE UP (ref 11.5–15.5)
HGB BLD-MCNC: 13.1 G/DL — SIGNIFICANT CHANGE UP (ref 11.5–15.5)
HGB BLD-MCNC: 13.9 G/DL — SIGNIFICANT CHANGE UP (ref 11.5–15.5)
HGB BLD-MCNC: 14.5 G/DL — SIGNIFICANT CHANGE UP (ref 11.5–15.5)
INR BLD: 0.77 RATIO — LOW (ref 0.88–1.16)
LDH SERPL L TO P-CCNC: 540 U/L — HIGH (ref 50–242)
LDH SERPL L TO P-CCNC: 596 U/L — HIGH (ref 50–242)
MAGNESIUM SERPL-MCNC: 5.5 MG/DL — HIGH (ref 1.6–2.6)
MAGNESIUM SERPL-MCNC: 6.5 MG/DL — HIGH (ref 1.6–2.6)
MAGNESIUM SERPL-MCNC: 6.6 MG/DL — HIGH (ref 1.6–2.6)
MAGNESIUM SERPL-MCNC: 6.7 MG/DL — HIGH (ref 1.6–2.6)
MAGNESIUM SERPL-MCNC: 6.7 MG/DL — HIGH (ref 1.6–2.6)
MCHC RBC-ENTMCNC: 31.8 PG — SIGNIFICANT CHANGE UP (ref 27–34)
MCHC RBC-ENTMCNC: 32.4 PG — SIGNIFICANT CHANGE UP (ref 27–34)
MCHC RBC-ENTMCNC: 32.7 PG — SIGNIFICANT CHANGE UP (ref 27–34)
MCHC RBC-ENTMCNC: 32.8 PG — SIGNIFICANT CHANGE UP (ref 27–34)
MCHC RBC-ENTMCNC: 32.8 PG — SIGNIFICANT CHANGE UP (ref 27–34)
MCHC RBC-ENTMCNC: 33.3 GM/DL — SIGNIFICANT CHANGE UP (ref 32–36)
MCHC RBC-ENTMCNC: 33.9 GM/DL — SIGNIFICANT CHANGE UP (ref 32–36)
MCHC RBC-ENTMCNC: 34.8 GM/DL — SIGNIFICANT CHANGE UP (ref 32–36)
MCHC RBC-ENTMCNC: 35 GM/DL — SIGNIFICANT CHANGE UP (ref 32–36)
MCHC RBC-ENTMCNC: 35.1 GM/DL — SIGNIFICANT CHANGE UP (ref 32–36)
MCV RBC AUTO: 93.5 FL — SIGNIFICANT CHANGE UP (ref 80–100)
MCV RBC AUTO: 93.6 FL — SIGNIFICANT CHANGE UP (ref 80–100)
MCV RBC AUTO: 93.9 FL — SIGNIFICANT CHANGE UP (ref 80–100)
MCV RBC AUTO: 95.4 FL — SIGNIFICANT CHANGE UP (ref 80–100)
MCV RBC AUTO: 95.6 FL — SIGNIFICANT CHANGE UP (ref 80–100)
NRBC # BLD: 0 /100 WBCS — SIGNIFICANT CHANGE UP (ref 0–0)
PHOSPHATE SERPL-MCNC: 4.1 MG/DL — SIGNIFICANT CHANGE UP (ref 2.5–4.5)
PHOSPHATE SERPL-MCNC: 4.6 MG/DL — HIGH (ref 2.5–4.5)
PHOSPHATE SERPL-MCNC: 4.8 MG/DL — HIGH (ref 2.5–4.5)
PHOSPHATE SERPL-MCNC: 4.8 MG/DL — HIGH (ref 2.5–4.5)
PHOSPHATE SERPL-MCNC: 4.9 MG/DL — HIGH (ref 2.5–4.5)
PLATELET # BLD AUTO: 105 K/UL — LOW (ref 150–400)
PLATELET # BLD AUTO: 88 K/UL — LOW (ref 150–400)
PLATELET # BLD AUTO: 93 K/UL — LOW (ref 150–400)
PLATELET # BLD AUTO: 94 K/UL — LOW (ref 150–400)
PLATELET # BLD AUTO: 98 K/UL — LOW (ref 150–400)
POTASSIUM SERPL-MCNC: 4.9 MMOL/L — SIGNIFICANT CHANGE UP (ref 3.5–5.3)
POTASSIUM SERPL-MCNC: 4.9 MMOL/L — SIGNIFICANT CHANGE UP (ref 3.5–5.3)
POTASSIUM SERPL-MCNC: 5 MMOL/L — SIGNIFICANT CHANGE UP (ref 3.5–5.3)
POTASSIUM SERPL-SCNC: 4.9 MMOL/L — SIGNIFICANT CHANGE UP (ref 3.5–5.3)
POTASSIUM SERPL-SCNC: 4.9 MMOL/L — SIGNIFICANT CHANGE UP (ref 3.5–5.3)
POTASSIUM SERPL-SCNC: 5 MMOL/L — SIGNIFICANT CHANGE UP (ref 3.5–5.3)
PROT SERPL-MCNC: 4.5 G/DL — LOW (ref 6–8.3)
PROT SERPL-MCNC: 4.8 G/DL — LOW (ref 6–8.3)
PROT SERPL-MCNC: 4.9 G/DL — LOW (ref 6–8.3)
PROT SERPL-MCNC: 5.2 G/DL — LOW (ref 6–8.3)
PROT SERPL-MCNC: 5.3 G/DL — LOW (ref 6–8.3)
PROTHROM AB SERPL-ACNC: 9.4 SEC — LOW (ref 10.6–13.6)
RBC # BLD: 3.64 M/UL — LOW (ref 3.8–5.2)
RBC # BLD: 3.9 M/UL — SIGNIFICANT CHANGE UP (ref 3.8–5.2)
RBC # BLD: 4 M/UL — SIGNIFICANT CHANGE UP (ref 3.8–5.2)
RBC # BLD: 4.37 M/UL — SIGNIFICANT CHANGE UP (ref 3.8–5.2)
RBC # BLD: 4.44 M/UL — SIGNIFICANT CHANGE UP (ref 3.8–5.2)
RBC # FLD: 14.2 % — SIGNIFICANT CHANGE UP (ref 10.3–14.5)
RBC # FLD: 14.2 % — SIGNIFICANT CHANGE UP (ref 10.3–14.5)
RBC # FLD: 14.4 % — SIGNIFICANT CHANGE UP (ref 10.3–14.5)
RBC # FLD: 14.5 % — SIGNIFICANT CHANGE UP (ref 10.3–14.5)
RBC # FLD: 14.6 % — HIGH (ref 10.3–14.5)
SARS-COV-2 IGG SERPL QL IA: NEGATIVE — SIGNIFICANT CHANGE UP
SARS-COV-2 IGM SERPL IA-ACNC: <0.1 INDEX — SIGNIFICANT CHANGE UP
SODIUM SERPL-SCNC: 131 MMOL/L — LOW (ref 135–145)
SODIUM SERPL-SCNC: 131 MMOL/L — LOW (ref 135–145)
SODIUM SERPL-SCNC: 132 MMOL/L — LOW (ref 135–145)
SODIUM SERPL-SCNC: 133 MMOL/L — LOW (ref 135–145)
SODIUM SERPL-SCNC: 133 MMOL/L — LOW (ref 135–145)
URATE SERPL-MCNC: 7.3 MG/DL — HIGH (ref 2.5–7)
URATE SERPL-MCNC: 7.9 MG/DL — HIGH (ref 2.5–7)
WBC # BLD: 12 K/UL — HIGH (ref 3.8–10.5)
WBC # BLD: 13.34 K/UL — HIGH (ref 3.8–10.5)
WBC # BLD: 13.36 K/UL — HIGH (ref 3.8–10.5)
WBC # BLD: 13.53 K/UL — HIGH (ref 3.8–10.5)
WBC # BLD: 14.15 K/UL — HIGH (ref 3.8–10.5)
WBC # FLD AUTO: 12 K/UL — HIGH (ref 3.8–10.5)
WBC # FLD AUTO: 13.34 K/UL — HIGH (ref 3.8–10.5)
WBC # FLD AUTO: 13.36 K/UL — HIGH (ref 3.8–10.5)
WBC # FLD AUTO: 13.53 K/UL — HIGH (ref 3.8–10.5)
WBC # FLD AUTO: 14.15 K/UL — HIGH (ref 3.8–10.5)

## 2020-11-28 PROCEDURE — 99222 1ST HOSP IP/OBS MODERATE 55: CPT | Mod: 25

## 2020-11-28 RX ORDER — CALCIUM GLUCONATE 100 MG/ML
2 VIAL (ML) INTRAVENOUS ONCE
Refills: 0 | Status: COMPLETED | OUTPATIENT
Start: 2020-11-28 | End: 2020-11-28

## 2020-11-28 RX ORDER — HYDROMORPHONE HYDROCHLORIDE 2 MG/ML
0.5 INJECTION INTRAMUSCULAR; INTRAVENOUS; SUBCUTANEOUS ONCE
Refills: 0 | Status: DISCONTINUED | OUTPATIENT
Start: 2020-11-28 | End: 2020-12-02

## 2020-11-28 RX ORDER — NIFEDIPINE 30 MG
60 TABLET, EXTENDED RELEASE 24 HR ORAL AT BEDTIME
Refills: 0 | Status: DISCONTINUED | OUTPATIENT
Start: 2020-11-28 | End: 2020-11-29

## 2020-11-28 RX ORDER — NIFEDIPINE 30 MG
60 TABLET, EXTENDED RELEASE 24 HR ORAL ONCE
Refills: 0 | Status: COMPLETED | OUTPATIENT
Start: 2020-11-28 | End: 2020-11-28

## 2020-11-28 RX ORDER — NICARDIPINE HYDROCHLORIDE 30 MG/1
5 CAPSULE, EXTENDED RELEASE ORAL
Qty: 40 | Refills: 0 | Status: DISCONTINUED | OUTPATIENT
Start: 2020-11-28 | End: 2020-11-28

## 2020-11-28 RX ORDER — OXYCODONE HYDROCHLORIDE 5 MG/1
5 TABLET ORAL EVERY 4 HOURS
Refills: 0 | Status: DISCONTINUED | OUTPATIENT
Start: 2020-11-28 | End: 2020-12-02

## 2020-11-28 RX ORDER — MAGNESIUM SULFATE 500 MG/ML
1.5 VIAL (ML) INJECTION
Qty: 40 | Refills: 0 | Status: DISCONTINUED | OUTPATIENT
Start: 2020-11-27 | End: 2020-11-29

## 2020-11-28 RX ORDER — HEPARIN SODIUM 5000 [USP'U]/ML
5000 INJECTION INTRAVENOUS; SUBCUTANEOUS EVERY 8 HOURS
Refills: 0 | Status: DISCONTINUED | OUTPATIENT
Start: 2020-11-28 | End: 2020-11-30

## 2020-11-28 RX ORDER — SODIUM CHLORIDE 9 MG/ML
1000 INJECTION, SOLUTION INTRAVENOUS
Refills: 0 | Status: DISCONTINUED | OUTPATIENT
Start: 2020-11-28 | End: 2020-11-28

## 2020-11-28 RX ADMIN — HEPARIN SODIUM 5000 UNIT(S): 5000 INJECTION INTRAVENOUS; SUBCUTANEOUS at 06:26

## 2020-11-28 RX ADMIN — Medication 325 MILLIGRAM(S): at 12:17

## 2020-11-28 RX ADMIN — NICARDIPINE HYDROCHLORIDE 25 MG/HR: 30 CAPSULE, EXTENDED RELEASE ORAL at 08:05

## 2020-11-28 RX ADMIN — HEPARIN SODIUM 5000 UNIT(S): 5000 INJECTION INTRAVENOUS; SUBCUTANEOUS at 14:06

## 2020-11-28 RX ADMIN — Medication 200 GRAM(S): at 02:56

## 2020-11-28 RX ADMIN — Medication 1 TABLET(S): at 12:17

## 2020-11-28 RX ADMIN — HEPARIN SODIUM 5000 UNIT(S): 5000 INJECTION INTRAVENOUS; SUBCUTANEOUS at 22:25

## 2020-11-28 RX ADMIN — OXYCODONE HYDROCHLORIDE 5 MILLIGRAM(S): 5 TABLET ORAL at 22:25

## 2020-11-28 RX ADMIN — CHLORHEXIDINE GLUCONATE 1 APPLICATION(S): 213 SOLUTION TOPICAL at 05:18

## 2020-11-28 RX ADMIN — Medication 60 MILLIGRAM(S): at 22:25

## 2020-11-28 RX ADMIN — SODIUM CHLORIDE 50 MILLILITER(S): 9 INJECTION, SOLUTION INTRAVENOUS at 08:05

## 2020-11-28 RX ADMIN — OXYCODONE HYDROCHLORIDE 5 MILLIGRAM(S): 5 TABLET ORAL at 23:08

## 2020-11-28 RX ADMIN — Medication 60 MILLIGRAM(S): at 00:39

## 2020-11-28 NOTE — CHART NOTE - NSCHARTNOTEFT_GEN_A_CORE
N Monitor for atonia secondary to Mg gtt. Multimodal pain mgt.  P On RA. Monitor.  C Cardene gtt. BP control in setting of Preeclampsia/HELLP. Continue Mg gtt, goal Mg <7, continue for 24h.  R Monitor UOP and electroytes.  G Monitor platelets and LFTs. Serial labs.  DVT SQ UFH and SCDs.  E Monitor glycemia.  D/w with obstetrics team.

## 2020-11-28 NOTE — CONSULT NOTE ADULT - ASSESSMENT
PLAN:  30y 31yo  @ 35w 3 d presents from the office with elevated BP for R/O PEC, pt with new onset elevated BP in the office today 150/90.  Pt denies HA, visual changes or epigastric/RUQ pain.  Pt does states that she has alot of LE edema and also swelling of her abdomen and was told she had fluid on ultrasound outside the uterus.  Patient with preeclampsia and taken for urgent C section. Patient transferred to SICU post operatively for Cardizem gtt and magnesium gtt    Neuro:   - AOx3  - Monitor mental status  - Pain control as needed with Dilaudid and oxy    Resp:   - Satting well on room air  - Monitor pulse oximeter  - Out of bed to chair, ambulate as tolerated, and incentive spirometry to prevent atelectasis    CV:  - On Cardizem gtt  - Maintain SBP < 140  - Monitor vital signs    GI:   - Regular diet       Renal:  - On 50 LR  - On Magnesium gtt for preeclampsia   - Monitor I&Os  - Monitor electrolytes and replete as necessary    Heme:  - Monitor CBC and coags  - Holding DVT ppx    ID:   - Monitor for clinical evidence of active infection    Endo:   - Monitor glucose     Disposition:  - Full code  - SICU   PLAN:  30y 29yo  @ 35w 3 d presents from the office with elevated BP for R/O PEC, pt with new onset elevated BP in the office today 150/90.  Pt denies HA, visual changes or epigastric/RUQ pain.  Pt does states that she has alot of LE edema and also swelling of her abdomen and was told she had fluid on ultrasound outside the uterus.  Patient with preeclampsia and taken for urgent C section. Patient transferred to SICU post operatively for Cardizem gtt and magnesium gtt    Neuro:   - AOx3  - Monitor mental status  - Pain control as needed with Dilaudid and oxy    Resp:   - Satting well on room air  - Monitor pulse oximeter  - Out of bed to chair, ambulate as tolerated, and incentive spirometry to prevent atelectasis    CV:  - On Cardizem gtt  - 60 Cardizem PO   - Maintain SBP < 140  - Monitor vital signs    GI:   - Regular diet       Renal:  - On 50 LR  - On Magnesium gtt for preeclampsia   - Monitor I&Os  - Monitor electrolytes and replete as necessary    Heme:  - Monitor CBC and coags  - Holding DVT ppx    ID:   - Monitor for clinical evidence of active infection    Endo:   - Monitor glucose     Disposition:  - Full code  - SICU

## 2020-11-28 NOTE — PROGRESS NOTE ADULT - NUTRITIONAL ASSESSMENT
- Appreciate excellent SICU care  - Remains on cardene gtt for BP control  - Remains on magnesium sulfate, will continue for 24 hours from time of delivery. Aim to keep serum magnesium level <7.   - Once patient stable and out of SICU, will consult nephrology for nephrotic range proteinuria. Recommend prophylactic HSQ at this time.     LISA Kramer PGY4

## 2020-11-28 NOTE — PROGRESS NOTE ADULT - ASSESSMENT
29yo  POD#1 from [facility] for sPEC at 35w3d due to increasing requirements of antihypertensive medication and concern for developing HELLP syndrome with downtrending platelet and rising LFTs, now in SICU for critical care secondary to need for cardene gtt to control blood pressures, in stable condition.

## 2020-11-28 NOTE — CHART NOTE - NSCHARTNOTEFT_GEN_A_CORE
Pt seen and evaluated at bedside  31 y/o  POD1 from [facility] for sPEC at 35w3d currently in SICU for BP control, maxed out on IV antihypertensives within 24 hour period requiring cardine drip  Pt reports intermittently feeling lightheaded with fuzzy vision, similar to when she is not wearing glasses. Denies headaches. Denies abdominal pain. Not yet OOB. Swenson in placed draining clear yellow urine    Last Mag level 6.6 at 815am  Mag currently running at rate of 1.5    Symptoms likely 2/2 to magnesium, commonly known side effects. Low concern for Mag toxicity given recent Mag level of 6.6, Cr 0.84  Mag to be d/c'ed at 830pm, pt counseled that symptoms are likely to resolve once Mag comes off    Recommend 24 hour urine protein from swenson catheter, replayed to SICU team and primary nurse.    D/w Dr. Jerome chappell pgy3

## 2020-11-28 NOTE — PROGRESS NOTE ADULT - SUBJECTIVE AND OBJECTIVE BOX
Day 1 of Anesthesia Pain Management Service    SUBJECTIVE:  Pain Scale Score:          [X] Refer to charted pain scores    THERAPY:    s/p 2mg PF epidural morphine on 11/27/2020      MEDICATIONS  (STANDING):  chlorhexidine 2% Cloths 1 Application(s) Topical <User Schedule>  ferrous    sulfate 325 milliGRAM(s) Oral daily  heparin   Injectable 5000 Unit(s) SubCutaneous every 8 hours  lactated ringers. 1000 milliLiter(s) (50 mL/Hr) IV Continuous <Continuous>  magnesium sulfate Infusion 1.5 Gm/Hr (37.5 mL/Hr) IV Continuous <Continuous>  niCARdipine Infusion 5 mG/Hr (25 mL/Hr) IV Continuous <Continuous>  NIFEdipine XL 60 milliGRAM(s) Oral at bedtime  prenatal multivitamin 1 Tablet(s) Oral daily    MEDICATIONS  (PRN):  HYDROmorphone  Injectable 0.5 milliGRAM(s) IV Push once PRN Breakthrough Pain  lanolin Ointment 1 Application(s) Topical every 6 hours PRN Sore Nipples  magnesium hydroxide Suspension 30 milliLiter(s) Oral two times a day PRN Constipation  naloxone Injectable 0.1 milliGRAM(s) IV Push every 3 minutes PRN For ANY of the following changes in patient status:  A. Breaths Per Minute LESS THAN 10, B. Oxygen saturation LESS THAN 90%, C. Sedation score of 6 for Stop After: 4 Times  oxyCODONE    IR 5 milliGRAM(s) Oral every 4 hours PRN Severe Pain (7 - 10)  simethicone 80 milliGRAM(s) Chew every 4 hours PRN Gas      OBJECTIVE:    Sedation:        	[X] Alert	[ ] Drowsy	[ ] Arousable      [ ] Asleep       [ ] Unresponsive    Side Effects:	[X] None	[ ] Nausea	[ ] Vomiting         [ ] Pruritus  		[ ] Weakness            [ ] Numbness	          [ ] Other:    Vital Signs Last 24 Hrs  T(C): 36.5 (28 Nov 2020 07:00), Max: 36.9 (27 Nov 2020 13:07)  T(F): 97.7 (28 Nov 2020 07:00), Max: 98.42 (27 Nov 2020 13:07)  HR: 74 (28 Nov 2020 08:00) (58 - 88)  BP: 140/84 (28 Nov 2020 00:40) (123/84 - 203/114)  BP(mean): 109 (27 Nov 2020 23:45) (109 - 109)  RR: 14 (28 Nov 2020 08:00) (12 - 24)  SpO2: 97% (28 Nov 2020 08:00) (90% - 100%)    ASSESSMENT/ PLAN  [X] Patient transitioned to prn analgesics  [X] Pain management per primary service, pain service to sign off   [X]Documentation and Verification of current medications

## 2020-11-28 NOTE — CONSULT NOTE ADULT - SUBJECTIVE AND OBJECTIVE BOX
HISTORY OF PRESENT ILLNESS:  NIMO CORRAL is a [age]yo  @ 35w 3 d presents from the office with elevated BP for R/O PEC, pt with new onset elevated BP in the office today 150/90.  Pt denies HA, visual changes or epigastric/RUQ pain.  Pt does states that she has alot of LE edema and also swelling of her abdomen and was told she had fluid on ultrasound outside the uterus.  Patient with preeclampsia and taken for urgent C section. Patient transferred to SICU post operatively for Cardizem gtt and magnesium gtt    PAST MEDICAL HISTORY: Asthma in child    No pertinent past medical history    No pertinent past medical history        PAST SURGICAL HISTORY:     H/O dilation and curettage    No significant past surgical history        FAMILY HISTORY: No pertinent family history in first degree relatives        SOCIAL HISTORY:    CODE STATUS: Full code    HOME MEDICATIONS:    ALLERGIES: No Known Allergies      VITAL SIGNS:  ICU Vital Signs Last 24 Hrs  T(C): 36.9 (2020 00:40), Max: 36.9 (2020 13:07)  T(F): 98.4 (2020 00:40), Max: 98.42 (2020 13:07)  HR: 72 (2020 01:30) (58 - 88)  BP: 140/84 (2020 00:40) (123/84 - 203/114)  BP(mean): 109 (2020 23:45) (109 - 109)  ABP: 121/69 (2020 01:30) (120/69 - 162/101)  ABP(mean): 90 (2020 01:30) (89 - 130)  RR: 15 (2020 01:30) (14 - 23)  SpO2: 92% (2020 01:30) (90% - 100%)      NEURO  Exam: Aox3, in no acute distress, no focal deficits   Meds:HYDROmorphone  Injectable 0.5 milliGRAM(s) IV Push once PRN Breakthrough Pain  magnesium sulfate Infusion 2 Gm/Hr IV Continuous <Continuous>  oxyCODONE    IR 5 milliGRAM(s) Oral every 4 hours PRN Severe Pain (7 - 10)      RESPIRATORY  Mechanical Ventilation:   ABG - ( 2020 00:06 )  pH: 7.35  /  pCO2: 30    /  pO2: 107   / HCO3: 16    / Base Excess: -7.5  /  SaO2: 98      Lactate: x                Exam: Chest rise equal and b/l, no increased work of breathing   Meds:    CARDIOVASCULAR    Exam: RRR  Cardiac Rhythm: Sinus  Meds:niCARdipine Infusion 5 mG/Hr IV Continuous <Continuous>  NIFEdipine XL 60 milliGRAM(s) Oral at bedtime      GI/NUTRITION  Exam: Abdomen soft, non tender, non distended  Diet: Regular   Meds:magnesium hydroxide Suspension 30 milliLiter(s) Oral two times a day PRN Constipation  simethicone 80 milliGRAM(s) Chew every 4 hours PRN Gas      GENITOURINARY/RENAL  Meds:ferrous    sulfate 325 milliGRAM(s) Oral daily  lactated ringers. 1000 milliLiter(s) IV Continuous <Continuous>  prenatal multivitamin 1 Tablet(s) Oral daily       @ 07:  -   @ 01:51  --------------------------------------------------------  IN:    IV PiggyBack: 100 mL    Lactated Ringers: 98 mL    Lactated Ringers: 100 mL    Magnesium Sulfate: 100 mL    Magnesium Sulfate: 98 mL    NiCARdipine: 50 mL  Total IN: 546 mL    OUT:    Estimated Blood Loss (mL): 800 mL    Indwelling Catheter - Urethral (mL): 580 mL  Total OUT: 1380 mL    Total NET: -834 mL        Weight (kg): 104.3 ( @ 15:56)      133<L>  |  103  |  18  ----------------------------<  133<H>  5.0   |  15<L>  |  0.77    Ca    7.9<L>      2020 00:11  Phos  4.1       Mg     5.5         TPro  5.3<L>  /  Alb  2.5<L>  /  TBili  0.6  /  DBili  x   /  AST  215<H>  /  ALT  133<H>  /  AlkPhos  138<H>      [ ] Meek catheter, indication: urine output monitoring in critically ill patient    HEMATOLOGIC  [ ] VTE Prophylaxis:                          14.5   13.36 )-----------( 105      ( 2020 00:11 )             41.7     PT/INR - ( 2020 00:11 )   PT: 9.4 sec;   INR: 0.77 ratio         PTT - ( 2020 00:11 )  PTT:30.4 sec  Transfusion: [ ] PRBC	[ ] Platelets	[ ] FFP	[ ] Cryoprecipitate      INFECTIOUS DISEASES  Meds:  RECENT CULTURES:      ENDOCRINE  Meds:betamethasone Injectable 12 milliGRAM(s) IntraMuscular every 24 hours    CAPILLARY BLOOD GLUCOSE          PATIENT CARE ACCESS DEVICES:  [ ] Peripheral IV  [ ] Central Venous Line	[ ] R	[ ] L	[ ] IJ	[ ] Fem	[ ] SC	Placed:   [ ] Arterial Line		[ ] R	[ ] L	[ ] Fem	[ ] Rad	[ ] Ax	Placed:   [ ] PICC:					[ ] Mediport  [ ] Urinary Catheter, Date Placed:   [x] Necessity of urinary, arterial, and venous catheters discussed    OTHER MEDICATIONS: chlorhexidine 2% Cloths 1 Application(s) Topical <User Schedule>  lanolin Ointment 1 Application(s) Topical every 6 hours PRN  naloxone Injectable 0.1 milliGRAM(s) IV Push every 3 minutes PRN      IMAGING STUDIES:

## 2020-11-28 NOTE — PROGRESS NOTE ADULT - SUBJECTIVE AND OBJECTIVE BOX
31 y/o  POD#1 s/p 1LTCS  transferred to SICU after C/S due to increased demands with antihypertensives requiring cardene gtt. Pt without complaints at this time.  Denies headache, epigastric or RUQ pain, no N/V. Blurry vision and lightheadedness likely from MgSo4,    MEDICATIONS  (STANDING):  betamethasone Injectable 12 milliGRAM(s) IntraMuscular every 24 hours  chlorhexidine 2% Cloths 1 Application(s) Topical <User Schedule>  ferrous    sulfate 325 milliGRAM(s) Oral daily  lactated ringers. 1000 milliLiter(s) (50 mL/Hr) IV Continuous <Continuous>  magnesium sulfate Infusion 1 Gm/Hr (25 mL/Hr) IV Continuous <Continuous>  niCARdipine Infusion 5 mG/Hr (25 mL/Hr) IV Continuous <Continuous>  NIFEdipine XL 60 milliGRAM(s) Oral at bedtime  prenatal multivitamin 1 Tablet(s) Oral daily    MEDICATIONS  (PRN):  HYDROmorphone  Injectable 0.5 milliGRAM(s) IV Push once PRN Breakthrough Pain  lanolin Ointment 1 Application(s) Topical every 6 hours PRN Sore Nipples  magnesium hydroxide Suspension 30 milliLiter(s) Oral two times a day PRN Constipation  naloxone Injectable 0.1 milliGRAM(s) IV Push every 3 minutes PRN For ANY of the following changes in patient status:  A. Breaths Per Minute LESS THAN 10, B. Oxygen saturation LESS THAN 90%, C. Sedation score of 6 for Stop After: 4 Times  oxyCODONE    IR 5 milliGRAM(s) Oral every 4 hours PRN Severe Pain (7 - 10)  simethicone 80 milliGRAM(s) Chew every 4 hours PRN Gas      Vital Signs Last 24 Hrs  T(C): 36.6 (2020 03:00), Max: 36.9 (2020 13:07)  T(F): 97.9 (2020 03:00), Max: 98.42 (2020 13:07)  HR: 75 (2020 05:30) (58 - 88)  BP: 140/84 (2020 00:40) (123/84 - 203/114)  BP(mean): 109 (2020 23:45) (109 - 109)  RR: 24 (2020 05:30) (12 - 24)  SpO2: 97% (2020 05:30) (90% - 100%)     @ 07:01  -   @ 05:48  --------------------------------------------------------  IN: 1096 mL / OUT: 1645 mL / NET: -549 mL  UOP: 845cc for the shift thus far     PHYSICAL EXAM:    Gen: NAD, A+0 x 3  CV: RRR  Pulm: CTA BL, no increased work of breathing  Abd: Soft, appropriately tender, non distended, no rebound or guarding, +BS  Incision: Clean, dry and intact w/ dermabond over incision   Extremities: No calf tenderness, SCDs in place    Labs, additional tests:             13.9   13.34 )-----------( 94       (  @ 04:14 )             41.7                14.5   13.36 )-----------( 105      (  @ 00:11 )             41.7                14.3   12.67 )-----------( 108      (  @ 18:32 )             43.2                15.7   10.49 )-----------( 156      (  @ 14:45 )             46.1           132<L>  |  102  |  18  ----------------------------<  128<H>  4.9   |  15<L>  |  0.83    Ca    8.7      2020 04:14  Phos  4.6       Mg     6.7         TPro  5.2<L>  /  Alb  2.5<L>  /  TBili  0.5  /  DBili  x   /  AST  197<H>  /  ALT  120<H>  /  AlkPhos  133<H>

## 2020-11-28 NOTE — PROGRESS NOTE ADULT - SUBJECTIVE AND OBJECTIVE BOX
R4 OB Progress Note  POD#1     Pt seen and examined at bedside. Patient transferred to SICU after C/S due to increased demands with antihypertensives requiring cardene gtt. Pt without complaints at this time. Epidural remains in place, patient does not desire PCEA at this time, reports minimal pain.     She denies SOB/CP/palpitations, fever/chills, nausea/emesis. Tolerating sips of water. She has not yet ambulated or passed flatus. Meek remains in place.    MEDICATIONS  (STANDING):  betamethasone Injectable 12 milliGRAM(s) IntraMuscular every 24 hours  chlorhexidine 2% Cloths 1 Application(s) Topical <User Schedule>  ferrous    sulfate 325 milliGRAM(s) Oral daily  lactated ringers. 1000 milliLiter(s) (50 mL/Hr) IV Continuous <Continuous>  magnesium sulfate Infusion 1 Gm/Hr (25 mL/Hr) IV Continuous <Continuous>  niCARdipine Infusion 5 mG/Hr (25 mL/Hr) IV Continuous <Continuous>  NIFEdipine XL 60 milliGRAM(s) Oral at bedtime  prenatal multivitamin 1 Tablet(s) Oral daily    MEDICATIONS  (PRN):  HYDROmorphone  Injectable 0.5 milliGRAM(s) IV Push once PRN Breakthrough Pain  lanolin Ointment 1 Application(s) Topical every 6 hours PRN Sore Nipples  magnesium hydroxide Suspension 30 milliLiter(s) Oral two times a day PRN Constipation  naloxone Injectable 0.1 milliGRAM(s) IV Push every 3 minutes PRN For ANY of the following changes in patient status:  A. Breaths Per Minute LESS THAN 10, B. Oxygen saturation LESS THAN 90%, C. Sedation score of 6 for Stop After: 4 Times  oxyCODONE    IR 5 milliGRAM(s) Oral every 4 hours PRN Severe Pain (7 - 10)  simethicone 80 milliGRAM(s) Chew every 4 hours PRN Gas      Vital Signs Last 24 Hrs  T(C): 36.6 (28 Nov 2020 03:00), Max: 36.9 (27 Nov 2020 13:07)  T(F): 97.9 (28 Nov 2020 03:00), Max: 98.42 (27 Nov 2020 13:07)  HR: 75 (28 Nov 2020 05:30) (58 - 88)  BP: 140/84 (28 Nov 2020 00:40) (123/84 - 203/114)  BP(mean): 109 (27 Nov 2020 23:45) (109 - 109)  RR: 24 (28 Nov 2020 05:30) (12 - 24)  SpO2: 97% (28 Nov 2020 05:30) (90% - 100%)    11-27 @ 07:01  -  11-28 @ 05:48  --------------------------------------------------------  IN: 1096 mL / OUT: 1645 mL / NET: -549 mL  UOP: 845cc for the shift thus far     PHYSICAL EXAM:    Gen: NAD, A+0 x 3  CV: RRR  Pulm: CTA BL, no increased work of breathing  Abd: Soft, appropriately tender, non distended, no rebound or guarding, +BS  Incision: Clean, dry and intact w/ dermabond over incision   Extremities: No calf tenderness, SCDs in place    Labs, additional tests:             13.9   13.34 )-----------( 94       ( 11-28 @ 04:14 )             41.7                14.5   13.36 )-----------( 105      ( 11-28 @ 00:11 )             41.7                14.3   12.67 )-----------( 108      ( 11-27 @ 18:32 )             43.2                15.7   10.49 )-----------( 156      ( 11-27 @ 14:45 )             46.1       11-28    132<L>  |  102  |  18  ----------------------------<  128<H>  4.9   |  15<L>  |  0.83    Ca    8.7      28 Nov 2020 04:14  Phos  4.6     11-28  Mg     6.7     11-28    TPro  5.2<L>  /  Alb  2.5<L>  /  TBili  0.5  /  DBili  x   /  AST  197<H>  /  ALT  120<H>  /  AlkPhos  133<H>  11-28

## 2020-11-28 NOTE — PROGRESS NOTE ADULT - ASSESSMENT
31yo  POD#1 from pLTCS for sPEC at 35w3d due to increasing requirements of antihypertensive medication and concern for developing HELLP syndrome with downtrending platelet and rising LFTs. Labs improving now. BPS 120s-80s. Cardine drip off. Will start procardia 60 mg XL and titrate as needed.   Cardioobstetrics consultation recommended due to HELLP syndrome.   Will continue MgSo4 x 24h postpartum.   Monitor labs.  Heparin Ppx with stable pps, ambulation. 29yo  POD#1 from pLTCS for sPEC at 35w3d due to increasing requirements of antihypertensive medication and concern for developing HELLP syndrome with downtrending platelet and rising LFTs. Labs improving now. BPS 120s-80s. Cardine drip off. Will start procardia 60 mg XL and titrate as needed.   Cardioobstetrics consultation recommended due to HELLP syndrome.   Will continue MgSo4 x 24h postpartum.   Monitor labs.  Heparin Ppx with stable pps, ambulation.   Due to significant proteinuria in the nephrotic range nephro consulted and 24h urine collection started today. Will likely need postpartum anticoagulation due to proteinuria and increased risk for thrombosis in the setting of low albumin <2.5

## 2020-11-29 DIAGNOSIS — I10 ESSENTIAL (PRIMARY) HYPERTENSION: ICD-10-CM

## 2020-11-29 DIAGNOSIS — R80.9 PROTEINURIA, UNSPECIFIED: ICD-10-CM

## 2020-11-29 LAB
ALBUMIN SERPL ELPH-MCNC: 2.3 G/DL — LOW (ref 3.3–5)
ALBUMIN SERPL ELPH-MCNC: 2.3 G/DL — LOW (ref 3.3–5)
ALBUMIN SERPL ELPH-MCNC: 2.4 G/DL — LOW (ref 3.3–5)
ALP SERPL-CCNC: 120 U/L — SIGNIFICANT CHANGE UP (ref 40–120)
ALP SERPL-CCNC: 134 U/L — HIGH (ref 40–120)
ALP SERPL-CCNC: 140 U/L — HIGH (ref 40–120)
ALT FLD-CCNC: 122 U/L — HIGH (ref 10–45)
ALT FLD-CCNC: 68 U/L — HIGH (ref 10–45)
ALT FLD-CCNC: 73 U/L — HIGH (ref 10–45)
ANION GAP SERPL CALC-SCNC: 11 MMOL/L — SIGNIFICANT CHANGE UP (ref 5–17)
ANION GAP SERPL CALC-SCNC: 11 MMOL/L — SIGNIFICANT CHANGE UP (ref 5–17)
ANION GAP SERPL CALC-SCNC: 12 MMOL/L — SIGNIFICANT CHANGE UP (ref 5–17)
APTT BLD: 31.3 SEC — SIGNIFICANT CHANGE UP (ref 27.5–35.5)
APTT BLD: 38.5 SEC — HIGH (ref 27.5–35.5)
AST SERPL-CCNC: 177 U/L — HIGH (ref 10–40)
AST SERPL-CCNC: 89 U/L — HIGH (ref 10–40)
AST SERPL-CCNC: 90 U/L — HIGH (ref 10–40)
BASOPHILS # BLD AUTO: 0.01 K/UL — SIGNIFICANT CHANGE UP (ref 0–0.2)
BASOPHILS NFR BLD AUTO: 0.1 % — SIGNIFICANT CHANGE UP (ref 0–2)
BILIRUB SERPL-MCNC: 0.5 MG/DL — SIGNIFICANT CHANGE UP (ref 0.2–1.2)
BILIRUB SERPL-MCNC: 0.5 MG/DL — SIGNIFICANT CHANGE UP (ref 0.2–1.2)
BILIRUB SERPL-MCNC: 1.2 MG/DL — SIGNIFICANT CHANGE UP (ref 0.2–1.2)
BUN SERPL-MCNC: 19 MG/DL — SIGNIFICANT CHANGE UP (ref 7–23)
BUN SERPL-MCNC: 19 MG/DL — SIGNIFICANT CHANGE UP (ref 7–23)
BUN SERPL-MCNC: 20 MG/DL — SIGNIFICANT CHANGE UP (ref 7–23)
CALCIUM SERPL-MCNC: 7.5 MG/DL — LOW (ref 8.4–10.5)
CALCIUM SERPL-MCNC: 7.8 MG/DL — LOW (ref 8.4–10.5)
CALCIUM SERPL-MCNC: 7.9 MG/DL — LOW (ref 8.4–10.5)
CHLORIDE SERPL-SCNC: 100 MMOL/L — SIGNIFICANT CHANGE UP (ref 96–108)
CHLORIDE SERPL-SCNC: 102 MMOL/L — SIGNIFICANT CHANGE UP (ref 96–108)
CHLORIDE SERPL-SCNC: 105 MMOL/L — SIGNIFICANT CHANGE UP (ref 96–108)
CHOLEST SERPL-MCNC: 297 MG/DL — HIGH
CO2 SERPL-SCNC: 20 MMOL/L — LOW (ref 22–31)
CO2 SERPL-SCNC: 23 MMOL/L — SIGNIFICANT CHANGE UP (ref 22–31)
CO2 SERPL-SCNC: 23 MMOL/L — SIGNIFICANT CHANGE UP (ref 22–31)
CREAT SERPL-MCNC: 0.8 MG/DL — SIGNIFICANT CHANGE UP (ref 0.5–1.3)
CREAT SERPL-MCNC: 0.86 MG/DL — SIGNIFICANT CHANGE UP (ref 0.5–1.3)
CREAT SERPL-MCNC: 0.92 MG/DL — SIGNIFICANT CHANGE UP (ref 0.5–1.3)
EOSINOPHIL # BLD AUTO: 0 K/UL — SIGNIFICANT CHANGE UP (ref 0–0.5)
EOSINOPHIL NFR BLD AUTO: 0 % — SIGNIFICANT CHANGE UP (ref 0–6)
FIBRINOGEN PPP-MCNC: 678 MG/DL — HIGH (ref 290–520)
GLUCOSE SERPL-MCNC: 76 MG/DL — SIGNIFICANT CHANGE UP (ref 70–99)
GLUCOSE SERPL-MCNC: 84 MG/DL — SIGNIFICANT CHANGE UP (ref 70–99)
GLUCOSE SERPL-MCNC: 92 MG/DL — SIGNIFICANT CHANGE UP (ref 70–99)
HBV SURFACE AG SER-ACNC: SIGNIFICANT CHANGE UP
HCT VFR BLD CALC: 36.2 % — SIGNIFICANT CHANGE UP (ref 34.5–45)
HCT VFR BLD CALC: 37.2 % — SIGNIFICANT CHANGE UP (ref 34.5–45)
HCT VFR BLD CALC: 38.6 % — SIGNIFICANT CHANGE UP (ref 34.5–45)
HCV AB S/CO SERPL IA: 0.15 S/CO — SIGNIFICANT CHANGE UP (ref 0–0.99)
HCV AB SERPL-IMP: SIGNIFICANT CHANGE UP
HDLC SERPL-MCNC: 105 MG/DL — SIGNIFICANT CHANGE UP
HGB BLD-MCNC: 12.7 G/DL — SIGNIFICANT CHANGE UP (ref 11.5–15.5)
HGB BLD-MCNC: 12.9 G/DL — SIGNIFICANT CHANGE UP (ref 11.5–15.5)
HGB BLD-MCNC: 13.2 G/DL — SIGNIFICANT CHANGE UP (ref 11.5–15.5)
IMM GRANULOCYTES NFR BLD AUTO: 0.5 % — SIGNIFICANT CHANGE UP (ref 0–1.5)
INR BLD: 0.78 RATIO — LOW (ref 0.88–1.16)
INR BLD: 0.8 RATIO — LOW (ref 0.88–1.16)
LDH SERPL L TO P-CCNC: 667 U/L — HIGH (ref 50–242)
LDH SERPL L TO P-CCNC: 752 U/L — HIGH (ref 50–242)
LIPID PNL WITH DIRECT LDL SERPL: SIGNIFICANT CHANGE UP MG/DL
LYMPHOCYTES # BLD AUTO: 23.4 % — SIGNIFICANT CHANGE UP (ref 13–44)
LYMPHOCYTES # BLD AUTO: 3.11 K/UL — SIGNIFICANT CHANGE UP (ref 1–3.3)
MCHC RBC-ENTMCNC: 32.2 PG — SIGNIFICANT CHANGE UP (ref 27–34)
MCHC RBC-ENTMCNC: 32.5 PG — SIGNIFICANT CHANGE UP (ref 27–34)
MCHC RBC-ENTMCNC: 33.1 PG — SIGNIFICANT CHANGE UP (ref 27–34)
MCHC RBC-ENTMCNC: 34.2 GM/DL — SIGNIFICANT CHANGE UP (ref 32–36)
MCHC RBC-ENTMCNC: 34.7 GM/DL — SIGNIFICANT CHANGE UP (ref 32–36)
MCHC RBC-ENTMCNC: 35.1 GM/DL — SIGNIFICANT CHANGE UP (ref 32–36)
MCV RBC AUTO: 93.7 FL — SIGNIFICANT CHANGE UP (ref 80–100)
MCV RBC AUTO: 94.1 FL — SIGNIFICANT CHANGE UP (ref 80–100)
MCV RBC AUTO: 94.3 FL — SIGNIFICANT CHANGE UP (ref 80–100)
MONOCYTES # BLD AUTO: 0.84 K/UL — SIGNIFICANT CHANGE UP (ref 0–0.9)
MONOCYTES NFR BLD AUTO: 6.3 % — SIGNIFICANT CHANGE UP (ref 2–14)
NEUTROPHILS # BLD AUTO: 9.29 K/UL — HIGH (ref 1.8–7.4)
NEUTROPHILS NFR BLD AUTO: 69.7 % — SIGNIFICANT CHANGE UP (ref 43–77)
NON HDL CHOLESTEROL: 192 MG/DL — HIGH
NRBC # BLD: 0 /100 WBCS — SIGNIFICANT CHANGE UP (ref 0–0)
PHOSPHATE SERPL-MCNC: 2.9 MG/DL — SIGNIFICANT CHANGE UP (ref 2.5–4.5)
PHOSPHATE SERPL-MCNC: 3.6 MG/DL — SIGNIFICANT CHANGE UP (ref 2.5–4.5)
PLATELET # BLD AUTO: 50 K/UL — LOW (ref 150–400)
PLATELET # BLD AUTO: 77 K/UL — LOW (ref 150–400)
PLATELET # BLD AUTO: 80 K/UL — LOW (ref 150–400)
POTASSIUM SERPL-MCNC: 4.6 MMOL/L — SIGNIFICANT CHANGE UP (ref 3.5–5.3)
POTASSIUM SERPL-MCNC: 4.6 MMOL/L — SIGNIFICANT CHANGE UP (ref 3.5–5.3)
POTASSIUM SERPL-MCNC: 5.1 MMOL/L — SIGNIFICANT CHANGE UP (ref 3.5–5.3)
POTASSIUM SERPL-SCNC: 4.6 MMOL/L — SIGNIFICANT CHANGE UP (ref 3.5–5.3)
POTASSIUM SERPL-SCNC: 4.6 MMOL/L — SIGNIFICANT CHANGE UP (ref 3.5–5.3)
POTASSIUM SERPL-SCNC: 5.1 MMOL/L — SIGNIFICANT CHANGE UP (ref 3.5–5.3)
PROT SERPL-MCNC: 4.9 G/DL — LOW (ref 6–8.3)
PROT SERPL-MCNC: 5.2 G/DL — LOW (ref 6–8.3)
PROT SERPL-MCNC: 5.3 G/DL — LOW (ref 6–8.3)
PROTHROM AB SERPL-ACNC: 9.5 SEC — LOW (ref 10.6–13.6)
PROTHROM AB SERPL-ACNC: 9.7 SEC — LOW (ref 10.6–13.6)
RBC # BLD: 3.84 M/UL — SIGNIFICANT CHANGE UP (ref 3.8–5.2)
RBC # BLD: 3.97 M/UL — SIGNIFICANT CHANGE UP (ref 3.8–5.2)
RBC # BLD: 4.1 M/UL — SIGNIFICANT CHANGE UP (ref 3.8–5.2)
RBC # FLD: 15 % — HIGH (ref 10.3–14.5)
RBC # FLD: 15 % — HIGH (ref 10.3–14.5)
RBC # FLD: 15.3 % — HIGH (ref 10.3–14.5)
SODIUM SERPL-SCNC: 134 MMOL/L — LOW (ref 135–145)
SODIUM SERPL-SCNC: 136 MMOL/L — SIGNIFICANT CHANGE UP (ref 135–145)
SODIUM SERPL-SCNC: 137 MMOL/L — SIGNIFICANT CHANGE UP (ref 135–145)
TRIGL SERPL-MCNC: 459 MG/DL — HIGH
URATE SERPL-MCNC: 8.2 MG/DL — HIGH (ref 2.5–7)
URATE SERPL-MCNC: 8.5 MG/DL — HIGH (ref 2.5–7)
WBC # BLD: 12.97 K/UL — HIGH (ref 3.8–10.5)
WBC # BLD: 13.31 K/UL — HIGH (ref 3.8–10.5)
WBC # BLD: 14.22 K/UL — HIGH (ref 3.8–10.5)
WBC # FLD AUTO: 12.97 K/UL — HIGH (ref 3.8–10.5)
WBC # FLD AUTO: 13.31 K/UL — HIGH (ref 3.8–10.5)
WBC # FLD AUTO: 14.22 K/UL — HIGH (ref 3.8–10.5)

## 2020-11-29 PROCEDURE — 99223 1ST HOSP IP/OBS HIGH 75: CPT | Mod: GC

## 2020-11-29 PROCEDURE — 99223 1ST HOSP IP/OBS HIGH 75: CPT

## 2020-11-29 RX ORDER — NIFEDIPINE 30 MG
20 TABLET, EXTENDED RELEASE 24 HR ORAL ONCE
Refills: 0 | Status: COMPLETED | OUTPATIENT
Start: 2020-11-29 | End: 2020-11-29

## 2020-11-29 RX ORDER — NIFEDIPINE 30 MG
90 TABLET, EXTENDED RELEASE 24 HR ORAL AT BEDTIME
Refills: 0 | Status: DISCONTINUED | OUTPATIENT
Start: 2020-11-29 | End: 2020-12-02

## 2020-11-29 RX ORDER — IBUPROFEN 200 MG
600 TABLET ORAL EVERY 6 HOURS
Refills: 0 | Status: DISCONTINUED | OUTPATIENT
Start: 2020-11-29 | End: 2020-11-29

## 2020-11-29 RX ORDER — NIFEDIPINE 30 MG
10 TABLET, EXTENDED RELEASE 24 HR ORAL ONCE
Refills: 0 | Status: COMPLETED | OUTPATIENT
Start: 2020-11-29 | End: 2020-11-29

## 2020-11-29 RX ADMIN — OXYCODONE HYDROCHLORIDE 5 MILLIGRAM(S): 5 TABLET ORAL at 12:48

## 2020-11-29 RX ADMIN — HEPARIN SODIUM 5000 UNIT(S): 5000 INJECTION INTRAVENOUS; SUBCUTANEOUS at 05:30

## 2020-11-29 RX ADMIN — OXYCODONE HYDROCHLORIDE 5 MILLIGRAM(S): 5 TABLET ORAL at 05:31

## 2020-11-29 RX ADMIN — OXYCODONE HYDROCHLORIDE 5 MILLIGRAM(S): 5 TABLET ORAL at 23:11

## 2020-11-29 RX ADMIN — HEPARIN SODIUM 5000 UNIT(S): 5000 INJECTION INTRAVENOUS; SUBCUTANEOUS at 22:38

## 2020-11-29 RX ADMIN — OXYCODONE HYDROCHLORIDE 5 MILLIGRAM(S): 5 TABLET ORAL at 09:43

## 2020-11-29 RX ADMIN — OXYCODONE HYDROCHLORIDE 5 MILLIGRAM(S): 5 TABLET ORAL at 22:41

## 2020-11-29 RX ADMIN — HEPARIN SODIUM 5000 UNIT(S): 5000 INJECTION INTRAVENOUS; SUBCUTANEOUS at 14:00

## 2020-11-29 RX ADMIN — Medication 20 MILLIGRAM(S): at 14:20

## 2020-11-29 RX ADMIN — Medication 1 TABLET(S): at 12:48

## 2020-11-29 RX ADMIN — Medication 325 MILLIGRAM(S): at 12:48

## 2020-11-29 RX ADMIN — OXYCODONE HYDROCHLORIDE 5 MILLIGRAM(S): 5 TABLET ORAL at 09:13

## 2020-11-29 RX ADMIN — Medication 90 MILLIGRAM(S): at 22:38

## 2020-11-29 RX ADMIN — OXYCODONE HYDROCHLORIDE 5 MILLIGRAM(S): 5 TABLET ORAL at 13:18

## 2020-11-29 RX ADMIN — Medication 10 MILLIGRAM(S): at 13:44

## 2020-11-29 NOTE — PROGRESS NOTE ADULT - ASSESSMENT
A/P: 31 y/o , POD#2 s/p pLTCS for sPEC at 35+wks. Patient admitted to SICU for BP control with a cardine infusion due to maxing out on IV antihypertensives within a 24 hour period, now transitioned to the OB floor. Patient's labs concerning for HELLP syndrome as well as nephrotic range proteinuria. Creatinine wnl. She is currently without symx of PEC.     -s/p magnesium for seizure prophylaxis   -BPs well controlled on Procardia 60XL, will continue this dose  -F/u AM HELLP labs   -F/u 24 hour urine protein - collection via swenson  -Nephrology to assess the patient this AM; she will likely require anticoagulation for nephrotic range proteinuria  -Regular diet   -Oxycodone prn for analgesia; avoid Tylenol due to transaminitis and NSAIDs due to thrombocytopenia  -Routine postpartum care    Crystal Valerio PGY-3   A/P: 31 y/o , POD#2 s/p pLTCS for sPEC at 35+wks. Patient admitted to SICU for BP control with a cardine infusion due to maxing out on IV antihypertensives within a 24 hour period, now transitioned to the OB floor. Patient's labs concerning for HELLP syndrome as well as nephrotic range proteinuria. Creatinine wnl. She is currently without symx of PEC.

## 2020-11-29 NOTE — PROGRESS NOTE ADULT - SUBJECTIVE AND OBJECTIVE BOX
OB Progress Note: LTCS, POD#2    S: Pain is well controlled. She is tolerating a regular diet and ambulating without difficulty. Denies HA, visual changes, CP/SOB, lightheadedness/dizziness, N/V and ruq/epigastric pain. Meek in situ.    O:  Vitals:  Vital Signs Last 24 Hrs  T(C): 37.2 (29 Nov 2020 05:22), Max: 37.2 (29 Nov 2020 05:22)  T(F): 98.9 (29 Nov 2020 05:22), Max: 98.9 (29 Nov 2020 05:22)  HR: 68 (29 Nov 2020 05:22) (65 - 79)  BP: 130/86 (29 Nov 2020 05:22) (125/71 - 141/84)  BP(mean): 92 (28 Nov 2020 18:00) (92 - 92)  RR: 18 (29 Nov 2020 05:22) (11 - 23)  SpO2: 96% (29 Nov 2020 05:22) (93% - 98%)      PE:  General: NAD  Abdomen: Soft, appropriately tender, mildly distended, firm uterine fundus  Incision: c/d/  Extremities: No LE erythema, edema      MEDICATIONS  (STANDING):  ferrous    sulfate 325 milliGRAM(s) Oral daily  heparin   Injectable 5000 Unit(s) SubCutaneous every 8 hours  NIFEdipine XL 60 milliGRAM(s) Oral at bedtime  prenatal multivitamin 1 Tablet(s) Oral daily      MEDICATIONS  (PRN):  HYDROmorphone  Injectable 0.5 milliGRAM(s) IV Push once PRN Breakthrough Pain  lanolin Ointment 1 Application(s) Topical every 6 hours PRN Sore Nipples  magnesium hydroxide Suspension 30 milliLiter(s) Oral two times a day PRN Constipation  naloxone Injectable 0.1 milliGRAM(s) IV Push every 3 minutes PRN For ANY of the following changes in patient status:  A. Breaths Per Minute LESS THAN 10, B. Oxygen saturation LESS THAN 90%, C. Sedation score of 6 for Stop After: 4 Times  oxyCODONE    IR 5 milliGRAM(s) Oral every 4 hours PRN Severe Pain (7 - 10)  simethicone 80 milliGRAM(s) Chew every 4 hours PRN Gas      Labs:  Blood type: O Positive  Rubella IgG: RPR: Negative                          13.2   12.97<H> >-----------< 80<L>    ( 11-29 @ 07:17 )             38.6                        11.8   13.53<H> >-----------< 88<L>    ( 11-28 @ 16:11 )             34.8                        12.8   14.15<H> >-----------< 98<L>    ( 11-28 @ 12:28 )             36.5                        13.1   12.00<H> >-----------< 93<L>    ( 11-28 @ 08:16 )             37.4                        13.9   13.34<H> >-----------< 94<L>    ( 11-28 @ 04:14 )             41.7                        14.5   13.36<H> >-----------< 105<L>    ( 11-28 @ 00:11 )             41.7                        14.3   12.67<H> >-----------< 108<L>    ( 11-27 @ 18:32 )             43.2                        15.7<H>   10.49 >-----------< 156    ( 11-27 @ 14:45 )             46.1<H>    11-29-20 @ 07:17      136  |  102  |  20  ----------------------------<  84  4.6   |  23  |  0.86    11-28-20 @ 16:11      131<L>  |  102  |  20  ----------------------------<  134<H>  5.0   |  19<L>  |  0.88    11-28-20 @ 12:28      133<L>  |  101  |  20  ----------------------------<  140<H>  5.0   |  18<L>  |  0.87    11-28-20 @ 08:16      131<L>  |  101  |  18  ----------------------------<  121<H>  4.9   |  17<L>  |  0.84    11-28-20 @ 04:14      132<L>  |  102  |  18  ----------------------------<  128<H>  4.9   |  15<L>  |  0.83    11-28-20 @ 00:11      133<L>  |  103  |  18  ----------------------------<  133<H>  5.0   |  15<L>  |  0.77    11-27-20 @ 18:32      133<L>  |  102  |  17  ----------------------------<  108<H>  4.9   |  20<L>  |  0.85    11-27-20 @ 14:45      136  |  103  |  17  ----------------------------<  75  4.9   |  19<L>  |  0.79        Ca    7.8<L>      29 Nov 2020 07:17  Ca    7.6<L>      28 Nov 2020 16:11  Ca    7.9<L>      28 Nov 2020 12:28  Ca    8.1<L>      28 Nov 2020 08:16  Ca    8.7      28 Nov 2020 04:14  Ca    7.9<L>      28 Nov 2020 00:11  Ca    8.5      27 Nov 2020 18:32  Ca    8.9      27 Nov 2020 14:45  Phos  3.6     11-29  Phos  4.9<H>     11-28  Phos  4.8<H>     11-28  Phos  4.8<H>     11-28  Phos  4.6<H>     11-28  Phos  4.1     11-28  Mg     6.5<H>     11-28  Mg     6.7<H>     11-28  Mg     6.6<H>     11-28  Mg     6.7<H>     11-28  Mg     5.5<H>     11-28  Mg     5.0<H>     11-27    TPro  5.3<L>  /  Alb  2.3<L>  /  TBili  0.5  /  DBili  x   /  AST  90<H>  /  ALT  73<H>  /  AlkPhos  120  11-29-20 @ 07:17  TPro  4.5<L>  /  Alb  2.1<L>  /  TBili  0.5  /  DBili  x   /  AST  107<H>  /  ALT  77<H>  /  AlkPhos  107  11-28-20 @ 16:11  TPro  4.9<L>  /  Alb  2.3<L>  /  TBili  0.5  /  DBili  x   /  AST  131<H>  /  ALT  92<H>  /  AlkPhos  119  11-28-20 @ 12:28  TPro  4.8<L>  /  Alb  2.3<L>  /  TBili  0.5  /  DBili  x   /  AST  150<H>  /  ALT  96<H>  /  AlkPhos  116  11-28-20 @ 08:16  TPro  5.2<L>  /  Alb  2.5<L>  /  TBili  0.5  /  DBili  x   /  AST  197<H>  /  ALT  120<H>  /  AlkPhos  133<H>  11-28-20 @ 04:14  TPro  5.3<L>  /  Alb  2.5<L>  /  TBili  0.6  /  DBili  x   /  AST  215<H>  /  ALT  133<H>  /  AlkPhos  138<H>  11-28-20 @ 00:11  TPro  5.6<L>  /  Alb  2.7<L>  /  TBili  0.6  /  DBili  x   /  AST  200<H>  /  ALT  151<H>  /  AlkPhos  148<H>  11-27-20 @ 18:32  TPro  6.3  /  Alb  2.8<L>  /  TBili  0.4  /  DBili  x   /  AST  60<H>  /  ALT  43  /  AlkPhos  160<H>  11-27-20 @ 14:45

## 2020-11-29 NOTE — CONSULT NOTE ADULT - ASSESSMENT
30F  @ 35w presented with hypertension 150/90 and lower extremity edema concerning for HEELP syndrome s/p cesarian delivery POD #2    1. HEELP syndrome:     BP stable- 130/79  Continue nifedipine XL 60, may be increased to 90 mg daily for BP <1  Obtain echocardiogram to assess LV function given lower extremity edema  Follow up 24-hour urine protein collection.   Trend Plt, LDH, LFT every 12 hours.       30F  @ 35w presented with hypertension 150/90 and lower extremity edema concerning for HEELP syndrome s/p cesarian delivery POD #2    1. HEELP syndrome: currently hemodynamically stable.   BP stable- 130/79  Continue nifedipine XL 60, may be increased to 90 mg daily for BP <1  Obtain echocardiogram to assess LV function given lower extremity edema  Follow up 24-hour urine protein collection.   Trend Plt, LDH, LFT every 12 hours.

## 2020-11-29 NOTE — CONSULT NOTE ADULT - PROBLEM SELECTOR RECOMMENDATION 2
Patient with HTN in the setting of pregnancy. Agree with Nifedipine however would increase dose to 90 mg daily. Continue to monitor BP aim for BP <130/<90.

## 2020-11-29 NOTE — CONSULT NOTE ADULT - SUBJECTIVE AND OBJECTIVE BOX
For all Cardiology service contact information, go to [email or web address] and use "[employer]" to login.    HPI:    30 F         PNC: Dr Michelle  PNI: uncomplicated  PNL: GBS ?, PC/C ratio 9.2 ( )  Prenatal US:   EFW 3lbs 15 oz ( 2%), AC <1%, KAYLI 10  11: vtx KAYLI 13  All: NKDA  Meds: PNV  PMHx: Denies   PSHx: D&C  Socialhx: denies x 3 ( quit tobacco prior o pregnancy)  OBhx: 2014 TOP --> D & C  GYNhx: Denies fibroids , ov cyst or STDs      HR: 63 (20 @ 13:39) (58 - 66)  BP: 167/114 (20 @ 13:24) (167/114 - 181/109)  SpO2: 100% (20 @ 13:39) (98% - 100%)    Gen: NAD  Heart: RRR  Lungs: CTA B/L  Abdomen: Gravid, NT, + swelling of lower abdomen  Ext: 3 + b/l LE edema, 3+ brisks reflexes    NST: 140's moderate variability + accels no decels  TOCO: none  VE:deferred  EFW: 3lbs 15 oz   BSUS: vtx in office today (2020 13:35)    PAST MEDICAL & SURGICAL HISTORY:  Asthma in child    No pertinent past medical history        H/O dilation and curettage      SHx: smoking [ ], alcohol [ ], recreational drugs [ ]     Cardiology Data:  -------------------------------------------------------------------------------------------  ROS:  CV: chest pain (-), palpitation (-), orthopnea (-), PND (-), edema (-)  PULM: SOB (-), cough (-), wheezing (-), hemoptysis (-).   CONST: fever (-), chills (-) or fatigue (-)  GI: abdominal distension (-), abdominal pain (-) , nausea/vomiting (-), hematemesis, (-), melena (-), hematochezia (-)  : dysuria (-), frequency (-), hematuria (-).   NEURO: numbness (-), weakness (-), dizziness (-)  SKIN: itching (-), rash (-)  HEENT:  visual changes (-); vertigo or throat pain (-);  neck stiffness (-)     All other review of systems is negative unless indicated above.   -------------------------------------------------------------------------------------------  VS:  T(C): 36.9 (20 @ 09:00), Max: 37.2 (20 @ 05:22)  HR: 92 (20 @ 09:00) (65 - 92)  BP: 138/92 (20 @ 09:00) (125/71 - 141/84)  RR: 18 (20 @ 09:00) (11 - 19)  SpO2: 97% (20 @ 09:00) (93% - 97%)  I&O's Summary     07:00  --------------------------------------------------------  IN: 1682.5 mL / OUT: 5300 mL / NET: -3617.5 mL      -  2020 11:49  --------------------------------------------------------  IN: 0 mL / OUT: 800 mL / NET: -800 mL      -------------------------------------------------------------------------------------------  EXAM:   PHYSICAL EXAM:  GENERAL: NAD, lying in bed comfortably  HEAD:  Atraumatic, Normocephalic  EYES: EOMI, PERRLA, conjunctiva and sclera clear  ENT: Moist mucous membranes  NECK: Supple, No JVD  CHEST/LUNG: Clear to auscultation bilaterally; No rales, rhonchi, wheezing, or rubs. Unlabored respirations  HEART: Regular rate and rhythm; No murmurs, rubs, or gallops  ABDOMEN: Bowel sounds present; Soft, Nontender, Nondistended.   EXTREMITIES:  2+ Peripheral Pulses, brisk capillary refill. No clubbing, cyanosis, or edema  NERVOUS SYSTEM:  Alert & Oriented X3, speech clear. No deficits   MSK: FROM all 4 extremities, full and equal strength  SKIN: No rashes or lesions  -------------------------------------------------------------------------------------------  LABS:                        13.2   12.97 )-----------( 80       ( 2020 07:17 )             38.6      -    136  |  102  |  20  ----------------------------<  84  4.6   |  23  |  0.86    Ca    7.8<L>      2020 07:17  Phos  3.6     -  Mg     6.5     -    TPro  5.3<L>  /  Alb  2.3<L>  /  TBili  0.5  /  DBili  x   /  AST  90<H>  /  ALT  73<H>  /  AlkPhos  120       PT/INR - ( 2020 07:21 )   PT: 9.5 sec;   INR: 0.78 ratio         PTT - ( 2020 07:21 )  PTT:31.3 sec       Troponin trend:    -------------------------------------------------------------------------------------------  Current Meds:  ferrous    sulfate 325 milliGRAM(s) Oral daily  heparin   Injectable 5000 Unit(s) SubCutaneous every 8 hours  HYDROmorphone  Injectable 0.5 milliGRAM(s) IV Push once PRN  lanolin Ointment 1 Application(s) Topical every 6 hours PRN  magnesium hydroxide Suspension 30 milliLiter(s) Oral two times a day PRN  naloxone Injectable 0.1 milliGRAM(s) IV Push every 3 minutes PRN  NIFEdipine XL 60 milliGRAM(s) Oral at bedtime  oxyCODONE    IR 5 milliGRAM(s) Oral every 4 hours PRN  prenatal multivitamin 1 Tablet(s) Oral daily  simethicone 80 milliGRAM(s) Chew every 4 hours PRN    ------------------------------------------------------------------------------------------- For all Cardiology service contact information, go to [email or web address] and use "[employer]" to login.    HPI:    30F  @ 35w presented with hypertension 150/90 and lower extremity edema concerning for HEELP syndrome. Patient BP was highest 150-60/. Platelet at admission 154 --> 80-90. LFTs concerning for mild transaminitis, LDH 600s, and INR 0.78.   Patient underwent cesarian delivery , and post-operatively transferred to SICU for magnesium and cardene drip.   Transitioned to OB floor and BP is well controlled on Procardia XL, currently BP is 130/78  Patient denies any CP, dyspnea.     HR: 63 (20 @ 13:39) (58 - 66)  BP: 167/114 (20 @ 13:24) (167/114 - 181/109)  SpO2: 100% (20 @ 13:39) (98% - 100%)      PAST MEDICAL & SURGICAL HISTORY:  Asthma in child    No pertinent past medical history    H/O dilation and curettage      SHx: smoking [ ], alcohol [ ], recreational drugs [ ]   Cardiology Data:  -------------------------------------------------------------------------------------------  ROS:  CV: chest pain (-), palpitation (-), orthopnea (-), PND (-), edema (-)  PULM: SOB (-), cough (-), wheezing (-), hemoptysis (-).   CONST: fever (-), chills (-) or fatigue (-)  GI: abdominal distension (-), abdominal pain (-) , nausea/vomiting (-), hematemesis, (-), melena (-), hematochezia (-)  : dysuria (-), frequency (-), hematuria (-).   NEURO: numbness (-), weakness (-), dizziness (-)  SKIN: itching (-), rash (-)  HEENT:  visual changes (-); vertigo or throat pain (-);  neck stiffness (-)     All other review of systems is negative unless indicated above.   -------------------------------------------------------------------------------------------  VS:  T(C): 36.9 (20 @ 09:00), Max: 37.2 (20 @ 05:22)  HR: 92 (20 @ 09:) (65 - 92)  BP: 138/92 (20 @ :) (125/71 - 141/84)  RR: 18 (20 @ :) (11 - 19)  SpO2: 97% (20 @ 09:) (93% - 97%)  I&O's Summary    2020 07:  -  2020 07:00  --------------------------------------------------------  IN: 1682.5 mL / OUT: 5300 mL / NET: -3617.5 mL      -  2020 12:04  --------------------------------------------------------  IN: 0 mL / OUT: 1200 mL / NET: -1200 mL      -------------------------------------------------------------------------------------------  EXAM:   PHYSICAL EXAM:  GENERAL: NAD, lying in bed comfortably  HEAD:  Atraumatic, Normocephalic  EYES: EOMI, PERRLA, conjunctiva and sclera clear  ENT: Moist mucous membranes  NECK: Supple, No JVD  CHEST/LUNG: Clear to auscultation bilaterally; No rales, rhonchi, wheezing, or rubs. Unlabored respirations  HEART: Regular rate and rhythm; No murmurs, rubs, or gallops  ABDOMEN: Bowel sounds present; Soft, Nontender, Nondistended.   EXTREMITIES:  2+ Peripheral Pulses, brisk capillary refill. No clubbing, cyanosis, or edema  NERVOUS SYSTEM:  Alert & Oriented X3, speech clear. No deficits   MSK: FROM all 4 extremities, full and equal strength  SKIN: No rashes or lesions  -------------------------------------------------------------------------------------------  LABS:                        13.2   12.97 )-----------( 80       ( 2020 07:17 )             38.6          136  |  102  |  20  ----------------------------<  84  4.6   |  23  |  0.86    Ca    7.8<L>      2020 07:17  Phos  3.6       Mg     6.5         TPro  5.3<L>  /  Alb  2.3<L>  /  TBili  0.5  /  DBili  x   /  AST  90<H>  /  ALT  73<H>  /  AlkPhos  120       PT/INR - ( 2020 07:21 )   PT: 9.5 sec;   INR: 0.78 ratio         PTT - ( 2020 07:21 )  PTT:31.3 sec       Troponin trend:    -------------------------------------------------------------------------------------------  Current Meds:  ferrous    sulfate 325 milliGRAM(s) Oral daily  heparin   Injectable 5000 Unit(s) SubCutaneous every 8 hours  HYDROmorphone  Injectable 0.5 milliGRAM(s) IV Push once PRN  lanolin Ointment 1 Application(s) Topical every 6 hours PRN  magnesium hydroxide Suspension 30 milliLiter(s) Oral two times a day PRN  naloxone Injectable 0.1 milliGRAM(s) IV Push every 3 minutes PRN  NIFEdipine XL 60 milliGRAM(s) Oral at bedtime  oxyCODONE    IR 5 milliGRAM(s) Oral every 4 hours PRN  prenatal multivitamin 1 Tablet(s) Oral daily  simethicone 80 milliGRAM(s) Chew every 4 hours PRN    -------------------------------------------------------------------------------------------

## 2020-11-29 NOTE — CHART NOTE - NSCHARTNOTEFT_GEN_A_CORE
Backchart due to clinical responsibility    Pt with elevated BPs:  162/100 @1315  162/99  @1330  Procardia 10IR given@1344  Standing Procardia XL increased to 90  164/105 @1405    Procardia 20IR given @1420    147/92 @1440    Pt asymptomatic at this time    WIll continue with Procardia 90XL standing  CardiOB and Nephro following, appreciate recs      D/w Dr. Jerome chappell [email or web address]

## 2020-11-29 NOTE — CONSULT NOTE ADULT - ASSESSMENT
30 year old  female with no pertinent past medical history who presented to the hospital with elevated BP, low platelets and elevated liver enzymes requiring  delivery at 35w3d found to have continued hypertension and proteinuria.

## 2020-11-29 NOTE — CONSULT NOTE ADULT - SUBJECTIVE AND OBJECTIVE BOX
[facility] Division of Kidney Diseases & Hypertension  INITIAL CONSULT NOTE  [phone]--------------------------------------------------------------------------------  HPI: 30 year old female with no pertinent past medical history who presented to the hospital with elevated BP, low platelets and elevated liver enzymes        PAST HISTORY  --------------------------------------------------------------------------------  PAST MEDICAL & SURGICAL HISTORY:  Asthma in child    No pertinent past medical history        H/O dilation and curettage      FAMILY HISTORY:  No pertinent family history in first degree relatives      PAST SOCIAL HISTORY:    ALLERGIES & MEDICATIONS  --------------------------------------------------------------------------------  Allergies    No Known Allergies    Intolerances      Standing Inpatient Medications  ferrous    sulfate 325 milliGRAM(s) Oral daily  heparin   Injectable 5000 Unit(s) SubCutaneous every 8 hours  NIFEdipine XL 60 milliGRAM(s) Oral at bedtime  prenatal multivitamin 1 Tablet(s) Oral daily    PRN Inpatient Medications  HYDROmorphone  Injectable 0.5 milliGRAM(s) IV Push once PRN  lanolin Ointment 1 Application(s) Topical every 6 hours PRN  magnesium hydroxide Suspension 30 milliLiter(s) Oral two times a day PRN  naloxone Injectable 0.1 milliGRAM(s) IV Push every 3 minutes PRN  oxyCODONE    IR 5 milliGRAM(s) Oral every 4 hours PRN  simethicone 80 milliGRAM(s) Chew every 4 hours PRN      REVIEW OF SYSTEMS  --------------------------------------------------------------------------------  Gen: No  fevers/chills, weakness  Skin: No rashes  Head/Eyes/Ears/Mouth: No headache; Normal hearing; Normal vision w/o blurriness;   Respiratory: No dyspnea, cough, wheezing, hemoptysis  CV: No chest pain,   GI: No abdominal pain, diarrhea, constipation, nausea, vomiting  : No increased frequency, dysuria, hematuria, nocturia  MSK: No joint pain/swelling;   Neuro: No dizziness/lightheadedness, weakness, seizures  Psych: No issues with affect    All other systems were reviewed and are negative, except as noted.    VITALS/PHYSICAL EXAM  --------------------------------------------------------------------------------  T(C): 36.9 (20 @ 09:00), Max: 37.2 (20 @ 05:22)  HR: 92 (20 09:00) (65 - 92)  BP: 138/92 (20 09:00) (125/71 - 141/84)  RR: 18 (20 09:00) (11 - 19)  SpO2: 97% (20 09:00) (93% - 97%)  Wt(kg): --  Height (cm): 160 (20 15:56)  Weight (kg): 104.3 (20 15:56)  BMI (kg/m2): 40.7 (20 15:56)  BSA (m2): 2.05 (20 15:56)      20 @ 07:01  -  11-29-20 @ 07:00  --------------------------------------------------------  IN: 1682.5 mL / OUT: 5300 mL / NET: -3617.5 mL    20 @ 07:01  -  20 @ 12:21  --------------------------------------------------------  IN: 0 mL / OUT: 1200 mL / NET: -1200 mL      Physical Exam:  	Gen: NAD, well-appearing  	HEENT: PERRL, supple neck          Lymph: No palpable lymph nodes.  	Pulm: CTA B/L  	CV:  S1S2  	Abd: +BS, soft   	Ext: No B/L Lower ext edema  	Neuro: No focal deficits  	Skin: Warm, without rashes          Psych: Non-focal  	Vascular:          Access:    LABS/STUDIES  --------------------------------------------------------------------------------              13.2   12.97 >-----------<  80       [20 @ 07:17]              38.6     136  |  102  |  20  ----------------------------<  84      [20 @ 07:17]  4.6   |  23  |  0.86        Ca     7.8     [20 07:17]      Mg     6.5     [20 @ 16:11]      Phos  3.6     [20 07:17]    TPro  5.3  /  Alb  2.3  /  TBili  0.5  /  DBili  x   /  AST  90  /  ALT  73  /  AlkPhos  120  [20 @ 07:17]    PT/INR: PT 9.5  , INR 0.78       [20 07:21]  PTT: 31.3       [20 07:21]    Uric acid 8.2      [20 07:17]        [20 07:17]    Creatinine Trend:  SCr 0.86 [ 07:17]  SCr 0.88 [ 16:11]  SCr 0.87 [ 12:28]  SCr 0.84 [ 08:16]  SCr 0.83 [ 04:14]    Urinalysis - [20 @ 14:45]      Color Yellow / Appearance Slightly Turbid / SG 1.029 / pH 6.5      Gluc Negative / Ketone Negative  / Bili Negative / Urobili Negative       Blood Small / Protein >600 / Leuk Est Negative / Nitrite Negative      RBC 3 / WBC 81 / Hyaline 16 / Gran  / Sq Epi  / Non Sq Epi 9 / Bacteria Moderate    Urine Creatinine 128      [20 @ 14:45]  Urine Protein >2000      [20 @ 14:45]        Syphilis Screen (Treponema Pallidum Ab) Negative      [20 @ 17:21]   [facility] Division of Kidney Diseases & Hypertension  INITIAL CONSULT NOTE  [phone]--------------------------------------------------------------------------------  HPI: 30 year old  female with no pertinent past medical history who presented to the hospital with elevated BP, low platelets and elevated liver enzymes requiring  delivery at 35w3d found to have continued hypertension and proteinuria.    This is patients first pregnancy and has had no issues becoming pregnant with multiple miscarriages. Patient states that at the beginning of her third trimester she was noted to have proteinuria however it was trace. This was followed and monitored by OB physician however 2 weeks prior to admission patient noted that she all of a sudden had worsening swelling of her LE, abdomen, and face. Her proteinuria was also worsening at that time. Tuesday prior to admission patient was seen in OB office and was found to have a normal /80. On Friday prior to admission patient was noted to have an elevated -150s. Patient was directed to the hospital for monitoring. Patient noted to have large proteinuria, decreasing platelets, and elevated liver enzymes and elevated BP and was taken for  at 35w3d.    Post-partum patient believes her swelling is improving, however continues to have proteinuria, low albumin and BP issues. Nephrology consulted for management of proteinuria and HTN.    PAST HISTORY  --------------------------------------------------------------------------------  PAST MEDICAL & SURGICAL HISTORY:  Asthma in child    No pertinent past medical history        H/O dilation and curettage      FAMILY HISTORY:  No pertinent family history in first degree relatives      PAST SOCIAL HISTORY: No noted recent drug use.    ALLERGIES & MEDICATIONS  --------------------------------------------------------------------------------  Allergies    No Known Allergies    Intolerances      Standing Inpatient Medications  ferrous    sulfate 325 milliGRAM(s) Oral daily  heparin   Injectable 5000 Unit(s) SubCutaneous every 8 hours  NIFEdipine XL 60 milliGRAM(s) Oral at bedtime  prenatal multivitamin 1 Tablet(s) Oral daily    PRN Inpatient Medications  HYDROmorphone  Injectable 0.5 milliGRAM(s) IV Push once PRN  lanolin Ointment 1 Application(s) Topical every 6 hours PRN  magnesium hydroxide Suspension 30 milliLiter(s) Oral two times a day PRN  naloxone Injectable 0.1 milliGRAM(s) IV Push every 3 minutes PRN  oxyCODONE    IR 5 milliGRAM(s) Oral every 4 hours PRN  simethicone 80 milliGRAM(s) Chew every 4 hours PRN      REVIEW OF SYSTEMS  --------------------------------------------------------------------------------  Gen: No  fevers/chills, weakness  Skin: No rashes  Head/Eyes/Ears/Mouth: Blurry vision  No headache; Normal hearing; Normal vision w/o blurriness;   Respiratory: No dyspnea, cough, wheezing, hemoptysis  CV: No chest pain,   GI: Abdominal distention  : No increased frequency, dysuria, hematuria, nocturia  MSK: Swelling of LE   Neuro: No dizziness/lightheadedness, weakness, seizures  Psych: No issues with affect    All other systems were reviewed and are negative, except as noted.    VITALS/PHYSICAL EXAM  --------------------------------------------------------------------------------  T(C): 36.9 (20 @ 09:00), Max: 37.2 (20 @ 05:22)  HR: 92 (20 09:00) (65 - 92)  BP: 138/92 (20 @ 09:00) (125/71 - 141/84)  RR: 18 (20 @ 09:00) (11 - 19)  SpO2: 97% (20 09:00) (93% - 97%)  Wt(kg): --  Height (cm): 160 (20 @ 15:56)  Weight (kg): 104.3 (20 15:56)  BMI (kg/m2): 40.7 (20 15:56)  BSA (m2): 2.05 (20 15:56)      20 @ 07:01  -  20 @ 07:00  --------------------------------------------------------  IN: 1682.5 mL / OUT: 5300 mL / NET: -3617.5 mL    20 @ 07:01  -  20 @ 12:21  --------------------------------------------------------  IN: 0 mL / OUT: 1200 mL / NET: -1200 mL      Physical Exam:  	Gen: NAD, well-appearing  	HEENT: PERRL, supple neck              Lymph: No palpable lymph nodes.  	Pulm: Adequate chest rise.  	CV:  S1S2  	Abd: Distended abdomen   	Ext: Anasarca  	Neuro: No focal deficits  	Skin: Warm, without rashes              Psych: Non-focal  	Vascular: No cyanosis      LABS/STUDIES  --------------------------------------------------------------------------------              13.2   12.97 >-----------<  80       [20 07:17]              38.6     136  |  102  |  20  ----------------------------<  84      [20 07:17]  4.6   |  23  |  0.86        Ca     7.8     [20 07:17]      Mg     6.5     [20 16:11]      Phos  3.6     [20 07:17]    TPro  5.3  /  Alb  2.3  /  TBili  0.5  /  DBili  x   /  AST  90  /  ALT  73  /  AlkPhos  120  [20 07:17]    PT/INR: PT 9.5  , INR 0.78       [20 07:21]  PTT: 31.3       [20 07:21]    Uric acid 8.2      [20 07:17]        [20 07:17]    Creatinine Trend:  SCr 0.86 [:17]  SCr 0.88 [ 16:11]  SCr 0.87 [ 12:28]  SCr 0.84 [ 08:16]  SCr 0.83 [ 04:14]    Urinalysis - [20 @ 14:45]      Color Yellow / Appearance Slightly Turbid / SG 1.029 / pH 6.5      Gluc Negative / Ketone Negative  / Bili Negative / Urobili Negative       Blood Small / Protein >600 / Leuk Est Negative / Nitrite Negative      RBC 3 / WBC 81 / Hyaline 16 / Gran  / Sq Epi  / Non Sq Epi 9 / Bacteria Moderate    Urine Creatinine 128      [20 @ 14:45]  Urine Protein >2000      [20 14:45]        Syphilis Screen (Treponema Pallidum Ab) Negative      [20 17:21]   [facility] Division of Kidney Diseases & Hypertension  INITIAL CONSULT NOTE  [phone]--------------------------------------------------------------------------------  HPI: 30 year old  female with no pertinent past medical history who presented to the hospital with elevated BP, low platelets and elevated liver enzymes requiring  delivery at 35w3d found to have continued hypertension and proteinuria.    This is patients first pregnancy and has had no issues becoming pregnant with multiple miscarriages. Patient states that at the beginning of her third trimester she was noted to have proteinuria however it was trace. This was followed and monitored by OB physician however 2 weeks prior to admission patient noted that she all of a sudden had worsening swelling of her LE, abdomen, and face. Her proteinuria was also worsening at that time. Tuesday prior to admission patient was seen in OB office and was found to have a normal /80. On Friday prior to admission patient was noted to have an elevated -150s. Patient was directed to the hospital for monitoring. Patient noted to have large proteinuria, decreasing platelets, and elevated liver enzymes and elevated BP and was taken for  at 35w3d.    Post-partum patient believes her swelling is improving, however continues to have proteinuria, low albumin and BP issues. Nephrology consulted for management of proteinuria and HTN.    PAST HISTORY  --------------------------------------------------------------------------------  PAST MEDICAL & SURGICAL HISTORY:  Asthma in child    No pertinent past medical history        H/O dilation and curettage      FAMILY HISTORY:  No pertinent family history in first degree relatives      PAST SOCIAL HISTORY: No noted recent drug use.    ALLERGIES & MEDICATIONS  --------------------------------------------------------------------------------  Allergies    No Known Allergies    Intolerances      Standing Inpatient Medications  ferrous    sulfate 325 milliGRAM(s) Oral daily  heparin   Injectable 5000 Unit(s) SubCutaneous every 8 hours  NIFEdipine XL 60 milliGRAM(s) Oral at bedtime  prenatal multivitamin 1 Tablet(s) Oral daily    PRN Inpatient Medications  HYDROmorphone  Injectable 0.5 milliGRAM(s) IV Push once PRN  lanolin Ointment 1 Application(s) Topical every 6 hours PRN  magnesium hydroxide Suspension 30 milliLiter(s) Oral two times a day PRN  naloxone Injectable 0.1 milliGRAM(s) IV Push every 3 minutes PRN  oxyCODONE    IR 5 milliGRAM(s) Oral every 4 hours PRN  simethicone 80 milliGRAM(s) Chew every 4 hours PRN      REVIEW OF SYSTEMS  --------------------------------------------------------------------------------  Gen: No  fevers/chills, weakness  Skin: No rashes  Head/Eyes/Ears/Mouth: Blurry vision  No headache; Normal hearing; Normal vision w/o blurriness;   Respiratory: No dyspnea, cough, wheezing, hemoptysis  CV: No chest pain,   GI: Abdominal distention  : No increased frequency, dysuria, hematuria, nocturia  MSK: Swelling of LE   Neuro: No dizziness/lightheadedness, weakness, seizures  Psych: No issues with affect    All other systems were reviewed and are negative, except as noted.    VITALS/PHYSICAL EXAM  --------------------------------------------------------------------------------  T(C): 36.9 (20 @ 09:00), Max: 37.2 (20 @ 05:22)  HR: 92 (20 09:00) (65 - 92)  BP: 138/92 (20 @ 09:00) (125/71 - 141/84)  RR: 18 (20 09:00) (11 - 19)  SpO2: 97% (20 09:00) (93% - 97%)  Wt(kg): --  Height (cm): 160 (20 @ 15:56)  Weight (kg): 104.3 (20 15:56)  BMI (kg/m2): 40.7 (20 15:56)  BSA (m2): 2.05 (20 15:56)      20 @ 07:01  -  20 @ 07:00  --------------------------------------------------------  IN: 1682.5 mL / OUT: 5300 mL / NET: -3617.5 mL    20 @ 07:01  -  20 @ 12:21  --------------------------------------------------------  IN: 0 mL / OUT: 1200 mL / NET: -1200 mL      Physical Exam:  	Gen: NAD, well-appearing  	HEENT: PERRL, supple neck              Lymph: No palpable lymph nodes.  	Pulm: Adequate chest rise.  	CV:  S1S2  	Abd: Distended abdomen   	Ext: Anasarca 3+ arm and leg edema; +sacral edema  	Neuro: No focal deficits  	Skin: Warm, without rashes              Psych: Non-focal  	Vascular: No cyanosis      LABS/STUDIES  --------------------------------------------------------------------------------              13.2   12.97 >-----------<  80       [20 07:17]              38.6     136  |  102  |  20  ----------------------------<  84      [20 07:17]  4.6   |  23  |  0.86        Ca     7.8     [20 07:17]      Mg     6.5     [20 16:11]      Phos  3.6     [20 07:17]    TPro  5.3  /  Alb  2.3  /  TBili  0.5  /  DBili  x   /  AST  90  /  ALT  73  /  AlkPhos  120  [20 07:17]    PT/INR: PT 9.5  , INR 0.78       [20 07:21]  PTT: 31.3       [20 07:21]    Uric acid 8.2      [20 07:17]        [20 07:17]    Creatinine Trend:  SCr 0.86 [:17]  SCr 0.88 [ 16:11]  SCr 0.87 [ 12:28]  SCr 0.84 [ 08:16]  SCr 0.83 [ 04:14]    Urinalysis - [20 @ 14:45]      Color Yellow / Appearance Slightly Turbid / SG 1.029 / pH 6.5      Gluc Negative / Ketone Negative  / Bili Negative / Urobili Negative       Blood Small / Protein >600 / Leuk Est Negative / Nitrite Negative      RBC 3 / WBC 81 / Hyaline 16 / Gran  / Sq Epi  / Non Sq Epi 9 / Bacteria Moderate    Urine Creatinine 128      [20 @ 14:45]  Urine Protein >2000      [20 @ 14:45]        Syphilis Screen (Treponema Pallidum Ab) Negative      [20 17:21]

## 2020-11-29 NOTE — CONSULT NOTE ADULT - PROBLEM SELECTOR RECOMMENDATION 9
Patient with proteinuria likely in the setting of pre-eclampsia however with such high grade of proteinuria a full nephrotic syndrome workup should be done.    Please send PEDRO PABLO, dsDNA, Anti-Sm, APLS, EBV, Parvo, HIV, Hepatitis B/C, Complements, PLA2R, SPEP/JIN/FLC, RPR, [id number].  Please obtain Renal US with Duplex of renal artery and vein to rule/out RVT.  Please check Lipid Panel, may require Statin therapy  Would avoid diuretics for now as it can decrease breast milk production Patient with proteinuria likely in the setting of pre-eclampsia however with such high grade of proteinuria a full nephrotic syndrome workup should be done.    Please send PEDRO PABLO, dsDNA, Anti-Sm, APLS, EBV, Parvo, HIV, Hepatitis B/C, Complements c3 and c4, PLA2R, SPEP/Iimmunofixation/free light chains, RPR, [id number].  Please obtain Renal US with Duplex of renal artery and vein to rule/out RVT.  Please check Lipid Panel, may require Statin therapy  Would avoid diuretics for now as it can decrease breast milk production

## 2020-11-30 LAB
ALBUMIN SERPL ELPH-MCNC: 2.3 G/DL — LOW (ref 3.3–5)
ALBUMIN SERPL ELPH-MCNC: 2.3 G/DL — LOW (ref 3.3–5)
ALBUMIN SERPL ELPH-MCNC: 2.4 G/DL — LOW (ref 3.3–5)
ALP SERPL-CCNC: 126 U/L — HIGH (ref 40–120)
ALP SERPL-CCNC: 136 U/L — HIGH (ref 40–120)
ALP SERPL-CCNC: 144 U/L — HIGH (ref 40–120)
ALT FLD-CCNC: 112 U/L — HIGH (ref 10–45)
ALT FLD-CCNC: 120 U/L — HIGH (ref 10–45)
ALT FLD-CCNC: 138 U/L — HIGH (ref 10–45)
ANION GAP SERPL CALC-SCNC: 8 MMOL/L — SIGNIFICANT CHANGE UP (ref 5–17)
ANION GAP SERPL CALC-SCNC: 9 MMOL/L — SIGNIFICANT CHANGE UP (ref 5–17)
ANION GAP SERPL CALC-SCNC: 9 MMOL/L — SIGNIFICANT CHANGE UP (ref 5–17)
APPEARANCE UR: ABNORMAL
APTT BLD: 43.6 SEC — HIGH (ref 27.5–35.5)
APTT BLD: 53.6 SEC — HIGH (ref 27.5–35.5)
APTT BLD: 55.8 SEC — HIGH (ref 27.5–35.5)
APTT BLD: 57.4 SEC — HIGH (ref 27.5–35.5)
AST SERPL-CCNC: 144 U/L — HIGH (ref 10–40)
AST SERPL-CCNC: 183 U/L — HIGH (ref 10–40)
AST SERPL-CCNC: 216 U/L — HIGH (ref 10–40)
B19V IGG SER-ACNC: 0.29 INDEX — SIGNIFICANT CHANGE UP (ref 0–0.9)
B19V IGG+IGM SER-IMP: NEGATIVE — SIGNIFICANT CHANGE UP
B19V IGG+IGM SER-IMP: SIGNIFICANT CHANGE UP
B19V IGM FLD-ACNC: 0.27 INDEX — SIGNIFICANT CHANGE UP (ref 0–0.9)
B19V IGM SER-ACNC: NEGATIVE — SIGNIFICANT CHANGE UP
B2 GLYCOPROT1 AB SER QL: NEGATIVE — SIGNIFICANT CHANGE UP
BACTERIA # UR AUTO: ABNORMAL
BASOPHILS # BLD AUTO: 0.03 K/UL — SIGNIFICANT CHANGE UP (ref 0–0.2)
BASOPHILS NFR BLD AUTO: 0.2 % — SIGNIFICANT CHANGE UP (ref 0–2)
BILIRUB SERPL-MCNC: 0.9 MG/DL — SIGNIFICANT CHANGE UP (ref 0.2–1.2)
BILIRUB SERPL-MCNC: 1.1 MG/DL — SIGNIFICANT CHANGE UP (ref 0.2–1.2)
BILIRUB SERPL-MCNC: 1.1 MG/DL — SIGNIFICANT CHANGE UP (ref 0.2–1.2)
BILIRUB UR-MCNC: ABNORMAL
BLD GP AB SCN SERPL QL: NEGATIVE — SIGNIFICANT CHANGE UP
BUN SERPL-MCNC: 16 MG/DL — SIGNIFICANT CHANGE UP (ref 7–23)
BUN SERPL-MCNC: 17 MG/DL — SIGNIFICANT CHANGE UP (ref 7–23)
BUN SERPL-MCNC: 19 MG/DL — SIGNIFICANT CHANGE UP (ref 7–23)
CALCIUM SERPL-MCNC: 8.1 MG/DL — LOW (ref 8.4–10.5)
CALCIUM SERPL-MCNC: 8.1 MG/DL — LOW (ref 8.4–10.5)
CALCIUM SERPL-MCNC: 8.4 MG/DL — SIGNIFICANT CHANGE UP (ref 8.4–10.5)
CARDIOLIPIN AB SER-ACNC: NEGATIVE — SIGNIFICANT CHANGE UP
CHLORIDE SERPL-SCNC: 103 MMOL/L — SIGNIFICANT CHANGE UP (ref 96–108)
CHLORIDE SERPL-SCNC: 104 MMOL/L — SIGNIFICANT CHANGE UP (ref 96–108)
CHLORIDE SERPL-SCNC: 104 MMOL/L — SIGNIFICANT CHANGE UP (ref 96–108)
CO2 SERPL-SCNC: 24 MMOL/L — SIGNIFICANT CHANGE UP (ref 22–31)
CO2 SERPL-SCNC: 25 MMOL/L — SIGNIFICANT CHANGE UP (ref 22–31)
CO2 SERPL-SCNC: 25 MMOL/L — SIGNIFICANT CHANGE UP (ref 22–31)
COD CRY URNS QL: SIGNIFICANT CHANGE UP
COLLECT DURATION TIME UR: 24 HR — SIGNIFICANT CHANGE UP
COLOR SPEC: ABNORMAL
CREAT ?TM UR-MCNC: 102 MG/DL — SIGNIFICANT CHANGE UP
CREAT SERPL-MCNC: 0.78 MG/DL — SIGNIFICANT CHANGE UP (ref 0.5–1.3)
CREAT SERPL-MCNC: 0.86 MG/DL — SIGNIFICANT CHANGE UP (ref 0.5–1.3)
CREAT SERPL-MCNC: 1 MG/DL — SIGNIFICANT CHANGE UP (ref 0.5–1.3)
DIFF PNL FLD: ABNORMAL
DRVVT SCREEN TO CONFIRM RATIO: ABNORMAL
DSDNA AB SER-ACNC: <12 IU/ML — SIGNIFICANT CHANGE UP
EBV EA AB SER IA-ACNC: <5 U/ML — SIGNIFICANT CHANGE UP
EBV EA AB TITR SER IF: POSITIVE
EBV EA IGG SER-ACNC: NEGATIVE — SIGNIFICANT CHANGE UP
EBV NA IGG SER IA-ACNC: 477 U/ML — HIGH
EBV PATRN SPEC IB-IMP: SIGNIFICANT CHANGE UP
EBV VCA IGG AVIDITY SER QL IA: POSITIVE
EBV VCA IGM SER IA-ACNC: 696 U/ML — HIGH
EBV VCA IGM SER IA-ACNC: <10 U/ML — SIGNIFICANT CHANGE UP
EBV VCA IGM TITR FLD: NEGATIVE — SIGNIFICANT CHANGE UP
EOSINOPHIL # BLD AUTO: 0.02 K/UL — SIGNIFICANT CHANGE UP (ref 0–0.5)
EOSINOPHIL # BLD AUTO: 0.03 K/UL — SIGNIFICANT CHANGE UP (ref 0–0.5)
EOSINOPHIL # BLD AUTO: 0.03 K/UL — SIGNIFICANT CHANGE UP (ref 0–0.5)
EOSINOPHIL NFR BLD AUTO: 0.2 % — SIGNIFICANT CHANGE UP (ref 0–6)
EPI CELLS # UR: SIGNIFICANT CHANGE UP
FIBRINOGEN PPP-MCNC: 954 MG/DL — HIGH (ref 290–520)
FIBRINOGEN PPP-MCNC: 984 MG/DL — HIGH (ref 290–520)
GLUCOSE SERPL-MCNC: 80 MG/DL — SIGNIFICANT CHANGE UP (ref 70–99)
GLUCOSE SERPL-MCNC: 88 MG/DL — SIGNIFICANT CHANGE UP (ref 70–99)
GLUCOSE SERPL-MCNC: 95 MG/DL — SIGNIFICANT CHANGE UP (ref 70–99)
GLUCOSE UR QL: ABNORMAL
GRAN CASTS # UR COMP ASSIST: SIGNIFICANT CHANGE UP /LPF
HAPTOGLOB SERPL-MCNC: <20 MG/DL — LOW (ref 34–200)
HCT VFR BLD CALC: 32.2 % — LOW (ref 34.5–45)
HCT VFR BLD CALC: 33.5 % — LOW (ref 34.5–45)
HCT VFR BLD CALC: 34.4 % — LOW (ref 34.5–45)
HCT VFR BLD CALC: 35.9 % — SIGNIFICANT CHANGE UP (ref 34.5–45)
HGB BLD-MCNC: 10.8 G/DL — LOW (ref 11.5–15.5)
HGB BLD-MCNC: 11.2 G/DL — LOW (ref 11.5–15.5)
HGB BLD-MCNC: 11.7 G/DL — SIGNIFICANT CHANGE UP (ref 11.5–15.5)
HGB BLD-MCNC: 12.3 G/DL — SIGNIFICANT CHANGE UP (ref 11.5–15.5)
HIV 1+2 AB+HIV1 P24 AG SERPL QL IA: SIGNIFICANT CHANGE UP
HYALINE CASTS # UR AUTO: SIGNIFICANT CHANGE UP /LPF (ref 0–7)
IMM GRANULOCYTES NFR BLD AUTO: 0.5 % — SIGNIFICANT CHANGE UP (ref 0–1.5)
IMM GRANULOCYTES NFR BLD AUTO: 0.6 % — SIGNIFICANT CHANGE UP (ref 0–1.5)
IMM GRANULOCYTES NFR BLD AUTO: 0.7 % — SIGNIFICANT CHANGE UP (ref 0–1.5)
INR BLD: 0.81 RATIO — LOW (ref 0.88–1.16)
INR BLD: 0.83 RATIO — LOW (ref 0.88–1.16)
INR BLD: 0.83 RATIO — LOW (ref 0.88–1.16)
INR BLD: 0.84 RATIO — LOW (ref 0.88–1.16)
KAPPA LC SER QL IFE: 2.03 MG/DL — HIGH (ref 0.33–1.94)
KAPPA/LAMBDA FREE LIGHT CHAIN RATIO, SERUM: 1.02 RATIO — SIGNIFICANT CHANGE UP (ref 0.26–1.65)
KETONES UR-MCNC: NEGATIVE — SIGNIFICANT CHANGE UP
LA NT DPL PPP QL: 34.9 SEC — SIGNIFICANT CHANGE UP
LAMBDA LC SER QL IFE: 1.99 MG/DL — SIGNIFICANT CHANGE UP (ref 0.57–2.63)
LDH SERPL L TO P-CCNC: 643 U/L — HIGH (ref 50–242)
LDH SERPL L TO P-CCNC: 645 U/L — HIGH (ref 50–242)
LDH SERPL L TO P-CCNC: 722 U/L — HIGH (ref 50–242)
LEUKOCYTE ESTERASE UR-ACNC: NEGATIVE — SIGNIFICANT CHANGE UP
LYMPHOCYTES # BLD AUTO: 23.2 % — SIGNIFICANT CHANGE UP (ref 13–44)
LYMPHOCYTES # BLD AUTO: 26 % — SIGNIFICANT CHANGE UP (ref 13–44)
LYMPHOCYTES # BLD AUTO: 27.1 % — SIGNIFICANT CHANGE UP (ref 13–44)
LYMPHOCYTES # BLD AUTO: 3.18 K/UL — SIGNIFICANT CHANGE UP (ref 1–3.3)
LYMPHOCYTES # BLD AUTO: 3.34 K/UL — HIGH (ref 1–3.3)
LYMPHOCYTES # BLD AUTO: 3.51 K/UL — HIGH (ref 1–3.3)
MCHC RBC-ENTMCNC: 31.9 PG — SIGNIFICANT CHANGE UP (ref 27–34)
MCHC RBC-ENTMCNC: 32.2 PG — SIGNIFICANT CHANGE UP (ref 27–34)
MCHC RBC-ENTMCNC: 32.3 PG — SIGNIFICANT CHANGE UP (ref 27–34)
MCHC RBC-ENTMCNC: 32.6 PG — SIGNIFICANT CHANGE UP (ref 27–34)
MCHC RBC-ENTMCNC: 33.4 GM/DL — SIGNIFICANT CHANGE UP (ref 32–36)
MCHC RBC-ENTMCNC: 33.5 GM/DL — SIGNIFICANT CHANGE UP (ref 32–36)
MCHC RBC-ENTMCNC: 34 GM/DL — SIGNIFICANT CHANGE UP (ref 32–36)
MCHC RBC-ENTMCNC: 34.3 GM/DL — SIGNIFICANT CHANGE UP (ref 32–36)
MCV RBC AUTO: 94.2 FL — SIGNIFICANT CHANGE UP (ref 80–100)
MCV RBC AUTO: 95.4 FL — SIGNIFICANT CHANGE UP (ref 80–100)
MCV RBC AUTO: 95.8 FL — SIGNIFICANT CHANGE UP (ref 80–100)
MCV RBC AUTO: 96.1 FL — SIGNIFICANT CHANGE UP (ref 80–100)
MONOCYTES # BLD AUTO: 1.05 K/UL — HIGH (ref 0–0.9)
MONOCYTES # BLD AUTO: 1.08 K/UL — HIGH (ref 0–0.9)
MONOCYTES # BLD AUTO: 1.17 K/UL — HIGH (ref 0–0.9)
MONOCYTES NFR BLD AUTO: 7.9 % — SIGNIFICANT CHANGE UP (ref 2–14)
MONOCYTES NFR BLD AUTO: 8.1 % — SIGNIFICANT CHANGE UP (ref 2–14)
MONOCYTES NFR BLD AUTO: 9.1 % — SIGNIFICANT CHANGE UP (ref 2–14)
NEUTROPHILS # BLD AUTO: 8.21 K/UL — HIGH (ref 1.8–7.4)
NEUTROPHILS # BLD AUTO: 8.24 K/UL — HIGH (ref 1.8–7.4)
NEUTROPHILS # BLD AUTO: 9.32 K/UL — HIGH (ref 1.8–7.4)
NEUTROPHILS NFR BLD AUTO: 63.8 % — SIGNIFICANT CHANGE UP (ref 43–77)
NEUTROPHILS NFR BLD AUTO: 64 % — SIGNIFICANT CHANGE UP (ref 43–77)
NEUTROPHILS NFR BLD AUTO: 67.8 % — SIGNIFICANT CHANGE UP (ref 43–77)
NITRITE UR-MCNC: NEGATIVE — SIGNIFICANT CHANGE UP
NORMALIZED SCT PPP-RTO: 0.81 RATIO — SIGNIFICANT CHANGE UP (ref 0–1.16)
NORMALIZED SCT PPP-RTO: SIGNIFICANT CHANGE UP
NRBC # BLD: 0 /100 WBCS — SIGNIFICANT CHANGE UP (ref 0–0)
PH UR: 7 — SIGNIFICANT CHANGE UP (ref 5–8)
PHOSPHATE SERPL-MCNC: 3.5 MG/DL — SIGNIFICANT CHANGE UP (ref 2.5–4.5)
PLATELET # BLD AUTO: 33 K/UL — LOW (ref 150–400)
PLATELET # BLD AUTO: 35 K/UL — LOW (ref 150–400)
PLATELET # BLD AUTO: 36 K/UL — LOW (ref 150–400)
PLATELET # BLD AUTO: 37 K/UL — LOW (ref 150–400)
POTASSIUM SERPL-MCNC: 4.6 MMOL/L — SIGNIFICANT CHANGE UP (ref 3.5–5.3)
POTASSIUM SERPL-MCNC: 4.8 MMOL/L — SIGNIFICANT CHANGE UP (ref 3.5–5.3)
POTASSIUM SERPL-MCNC: 4.8 MMOL/L — SIGNIFICANT CHANGE UP (ref 3.5–5.3)
POTASSIUM SERPL-SCNC: 4.6 MMOL/L — SIGNIFICANT CHANGE UP (ref 3.5–5.3)
POTASSIUM SERPL-SCNC: 4.8 MMOL/L — SIGNIFICANT CHANGE UP (ref 3.5–5.3)
POTASSIUM SERPL-SCNC: 4.8 MMOL/L — SIGNIFICANT CHANGE UP (ref 3.5–5.3)
PROT 24H UR-MRATE: 6369 MG/24 H — HIGH (ref 50–100)
PROT ?TM UR-MCNC: 517 MG/DL — HIGH (ref 0–12)
PROT SERPL-MCNC: 4.9 G/DL — LOW (ref 6–8.3)
PROT SERPL-MCNC: 5.1 G/DL — LOW (ref 6–8.3)
PROT SERPL-MCNC: 5.2 G/DL — LOW (ref 6–8.3)
PROT UR-MCNC: ABNORMAL
PROT/CREAT UR-RTO: 5.1 RATIO — HIGH (ref 0–0.2)
PROTHROM AB SERPL-ACNC: 10.1 SEC — LOW (ref 10.6–13.6)
PROTHROM AB SERPL-ACNC: 10.1 SEC — LOW (ref 10.6–13.6)
PROTHROM AB SERPL-ACNC: 10.2 SEC — LOW (ref 10.6–13.6)
PROTHROM AB SERPL-ACNC: 9.8 SEC — LOW (ref 10.6–13.6)
RBC # BLD: 3.35 M/UL — LOW (ref 3.8–5.2)
RBC # BLD: 3.51 M/UL — LOW (ref 3.8–5.2)
RBC # BLD: 3.59 M/UL — LOW (ref 3.8–5.2)
RBC # BLD: 3.81 M/UL — SIGNIFICANT CHANGE UP (ref 3.8–5.2)
RBC # FLD: 15.3 % — HIGH (ref 10.3–14.5)
RBC # FLD: 15.4 % — HIGH (ref 10.3–14.5)
RBC # FLD: 15.5 % — HIGH (ref 10.3–14.5)
RBC # FLD: 15.5 % — HIGH (ref 10.3–14.5)
RBC CASTS # UR COMP ASSIST: SIGNIFICANT CHANGE UP /HPF (ref 0–4)
RETICS #: 102.3 K/UL — SIGNIFICANT CHANGE UP (ref 25–125)
RETICS/RBC NFR: 2.9 % — HIGH (ref 0.5–2.5)
RH IG SCN BLD-IMP: POSITIVE — SIGNIFICANT CHANGE UP
SODIUM SERPL-SCNC: 137 MMOL/L — SIGNIFICANT CHANGE UP (ref 135–145)
SP GR SPEC: 1.02 — SIGNIFICANT CHANGE UP (ref 1.01–1.02)
TOTAL VOLUME - 24 HOUR: 3300 ML — SIGNIFICANT CHANGE UP
TRI-PHOS CRY UR QL COMP ASSIST: SIGNIFICANT CHANGE UP
URATE CRY FLD QL MICRO: SIGNIFICANT CHANGE UP /HPF
URATE SERPL-MCNC: 7.7 MG/DL — HIGH (ref 2.5–7)
URATE SERPL-MCNC: 8 MG/DL — HIGH (ref 2.5–7)
URINE CREATININE CALCULATION: 1.1 G/24 H — SIGNIFICANT CHANGE UP (ref 0.8–1.8)
UROBILINOGEN FLD QL: ABNORMAL
WBC # BLD: 12.47 K/UL — HIGH (ref 3.8–10.5)
WBC # BLD: 12.84 K/UL — HIGH (ref 3.8–10.5)
WBC # BLD: 12.94 K/UL — HIGH (ref 3.8–10.5)
WBC # BLD: 13.73 K/UL — HIGH (ref 3.8–10.5)
WBC # FLD AUTO: 12.47 K/UL — HIGH (ref 3.8–10.5)
WBC # FLD AUTO: 12.84 K/UL — HIGH (ref 3.8–10.5)
WBC # FLD AUTO: 12.94 K/UL — HIGH (ref 3.8–10.5)
WBC # FLD AUTO: 13.73 K/UL — HIGH (ref 3.8–10.5)
WBC UR QL: SIGNIFICANT CHANGE UP /HPF (ref 0–5)

## 2020-11-30 PROCEDURE — 99233 SBSQ HOSP IP/OBS HIGH 50: CPT | Mod: GC

## 2020-11-30 PROCEDURE — 99223 1ST HOSP IP/OBS HIGH 75: CPT | Mod: GC

## 2020-11-30 RX ADMIN — HEPARIN SODIUM 5000 UNIT(S): 5000 INJECTION INTRAVENOUS; SUBCUTANEOUS at 05:59

## 2020-11-30 RX ADMIN — OXYCODONE HYDROCHLORIDE 5 MILLIGRAM(S): 5 TABLET ORAL at 20:45

## 2020-11-30 RX ADMIN — OXYCODONE HYDROCHLORIDE 5 MILLIGRAM(S): 5 TABLET ORAL at 06:07

## 2020-11-30 RX ADMIN — OXYCODONE HYDROCHLORIDE 5 MILLIGRAM(S): 5 TABLET ORAL at 16:23

## 2020-11-30 RX ADMIN — OXYCODONE HYDROCHLORIDE 5 MILLIGRAM(S): 5 TABLET ORAL at 15:53

## 2020-11-30 RX ADMIN — OXYCODONE HYDROCHLORIDE 5 MILLIGRAM(S): 5 TABLET ORAL at 05:37

## 2020-11-30 RX ADMIN — Medication 90 MILLIGRAM(S): at 23:16

## 2020-11-30 RX ADMIN — Medication 1 TABLET(S): at 12:55

## 2020-11-30 RX ADMIN — OXYCODONE HYDROCHLORIDE 5 MILLIGRAM(S): 5 TABLET ORAL at 11:05

## 2020-11-30 RX ADMIN — OXYCODONE HYDROCHLORIDE 5 MILLIGRAM(S): 5 TABLET ORAL at 10:35

## 2020-11-30 RX ADMIN — OXYCODONE HYDROCHLORIDE 5 MILLIGRAM(S): 5 TABLET ORAL at 21:17

## 2020-11-30 RX ADMIN — Medication 325 MILLIGRAM(S): at 12:54

## 2020-11-30 NOTE — CONSULT NOTE ADULT - ASSESSMENT
Assessment: 30y Female with proteinuria in the context of HELLP syndrome/severe preeclampsia. There is a wealth of clinical evidence suggesting that the proteinuria is related to HELLP syndrome/severe preeclampsia. There is the additional concern fo thrombocytopenia making this patient high risk for a high-bleeding-risk procedure. As such:    Plan:  -would wait for results of noninvasive diagnostic tests (advanced labs, renal duplex US) before pursuing invasive testing (biopsy)  -minimum platelets threshold of 50 for kidney biopsy  -maximum SBP threshold of 140mmHg for kidney biopsy  -please reconsult IR if proteinuria persists after results of noninvasive diagnostic testing are available and the above clinical criteria are met    Fredo Alves MD, RPVI  Chief Resident, Interventional Radiology  [facility]: [phone] ([phone]  [facility]: [phone]

## 2020-11-30 NOTE — CONSULT NOTE ADULT - ATTENDING COMMENTS
Pt seen and examined on admission to SICU. Pt feeling well, pain well-controlled. Pre-eclampsia HTN not relieved by initial oral nifedipine, SICU for cardene gtt and magn gtt per OB protocol. D/w OB staff.
30-year-old woman in her 3rd trimester of pregnancy consulted for thrombocytopenia due to HELLP. S/p delivery of the baby, which is the treatment of this condition There are evidence of complement activation in HELLP which causes TMA. Liver enzymes are improving. CBC is stable with stable platelet count. Watch the renal function, if of renal failure sets in we will consider C-5 antibody. Watch for bleeding/thrombosis. Supportive.
PEC/HELLPS/Proteinuria/Anasarca/ suspect Nephrotic Syndrome (?minimal change dz)  Patient reports her proteinuria started in the 3rd trimester and then 2 weeks ago reports sudden onset of progressive worsening leg edema, hand edema, facial edema, and increased abdominal girth.  Delivered via c section due to new onset of hypertension discovered at OB office  Suspect pt has underlying nephrotic syndrome with superimposed PEC/HELLPS    #Nephrotic syndrome/proteinuria- start GN workup as above  check renal sono with duplex of renal veins to r/o renal vein thrombosis  check lipid panel  may need renal biopsy; check coags  repeat urine prot/cr   #edema/anasarca- stable per patient- pt with good diuresis; monitor UO; monitor daily weights;  lasix if necessary but this can decrease breast milk production  #superimposed PEC/HELLPS- this degree of edema and proteinuria does not appear to be all PEC alone; monitor HELLPS labs; check adamsts 13, and anti phospholipd antibodies  #HTN management- okay to increase nifedipine to 90mg daily; monitor BP trend;

## 2020-11-30 NOTE — PROGRESS NOTE ADULT - SUBJECTIVE AND OBJECTIVE BOX
Dr. Joel BELL:[id number]/NS: [phone]  After 7pm please page [phone] or [phone]    NIMO CORRAL  30y  MRN: [id number]    Subjective:    Patient is a 30y old  Female who presents with a chief complaint of Pre-eclampsia (2020 09:08)  She feels better, noted that swelling had gone down a lot.     MEDICATIONS  (STANDING):  ferrous    sulfate 325 milliGRAM(s) Oral daily  NIFEdipine XL 90 milliGRAM(s) Oral at bedtime  prenatal multivitamin 1 Tablet(s) Oral daily    MEDICATIONS  (PRN):  HYDROmorphone  Injectable 0.5 milliGRAM(s) IV Push once PRN Breakthrough Pain  lanolin Ointment 1 Application(s) Topical every 6 hours PRN Sore Nipples  magnesium hydroxide Suspension 30 milliLiter(s) Oral two times a day PRN Constipation  naloxone Injectable 0.1 milliGRAM(s) IV Push every 3 minutes PRN For ANY of the following changes in patient status:  A. Breaths Per Minute LESS THAN 10, B. Oxygen saturation LESS THAN 90%, C. Sedation score of 6 for Stop After: 4 Times  oxyCODONE    IR 5 milliGRAM(s) Oral every 4 hours PRN Severe Pain (7 - 10)  simethicone 80 milliGRAM(s) Chew every 4 hours PRN Gas        Objective:    Vitals: Vital Signs Last 24 Hrs  T(C): 37 (20 @ 08:28), Max: 37 (20 @ 13:15)  T(F): 98.6 (20 @ 08:28), Max: 98.6 (20 @ 13:15)  HR: 94 (20 @ 08:28) (72 - 94)  BP: 119/82 (20 @ 08:28) (119/82 - 166/94)  BP(mean): --  RR: 18 (20 @ 08:28) (18 - 18)  SpO2: 95% (20 @ 08:28) (95% - 99%)            I&O's Summary    2020 07:01  -  2020 07:00  --------------------------------------------------------  IN: 0 mL / OUT: 3900 mL / NET: -3900 mL    2020 07:01  -  2020 12:10  --------------------------------------------------------  IN: 0 mL / OUT: 700 mL / NET: -700 mL        PHYSICAL EXAM:  GENERAL: NAD, well-groomed, well-developed  HEAD:  Atraumatic, Normocephalic  EYES: EOMI, PERRLA, conjunctiva and sclera clear  ENMT: No tonsillar erythema, exudates, or enlargement; Moist mucous membranes, Good dentition, No lesions  NECK: Supple, No JVD, Normal thyroid  CHEST/LUNG: Clear to auscultation bilaterally; No rales, rhonchi, wheezing, or rubs  HEART: Regular rate and rhythm; No murmurs, rubs, or gallops  ABDOMEN: Soft, Nontender, Nondistended; Bowel sounds present, overlying band s/p   SKIN: no bullae,  NERVOUS SYSTEM:  Alert & Oriented X4, Good concentration    LABS:      137  |  104  |  17  ----------------------------<  80  4.6   |  25  |  0.86      137  |  103  |  19  ----------------------------<  88  4.8   |  25  |  1.00      134<L>  |  100  |  19  ----------------------------<  92  4.6   |  23  |  0.92    Ca    8.1<L>      2020 05:44  Ca    8.1<L>      2020 01:37  Ca    7.9<L>      2020 18:36  Phos  2.9       Mg     6.5         TPro  4.9<L>  /  Alb  2.3<L>  /  TBili  1.1  /  DBili  x   /  AST  183<H>  /  ALT  120<H>  /  AlkPhos  126<H>    TPro  5.1<L>  /  Alb  2.4<L>  /  TBili  1.1  /  DBili  x   /  AST  216<H>  /  ALT  138<H>  /  AlkPhos  136<H>    TPro  5.2<L>  /  Alb  2.4<L>  /  TBili  1.2  /  DBili  x   /  AST  177<H>  /  ALT  122<H>  /  AlkPhos  134<H>        PT/INR - ( 2020 05:44 )   PT: 10.2 sec;   INR: 0.84 ratio         PTT - ( 2020 05:44 )  PTT:55.8 sec                                        11.2   12.47 )-----------( 33       ( 2020 05:44 )             33.5                         12.3   12.84 )-----------( 37       ( 2020 01:37 )             35.9                         12.9   14.22 )-----------( 50       ( 2020 18:36 )             37.2     CAPILLARY BLOOD GLUCOSE    Cholesterol, Serum: 297 mg/dL (20 @ 19:56)    Triglycerides, Serum: 459: Test Repeated mg/dL (20 @ 19:56)    HDL Cholesterol, Serum: 105 mg/dL (20 @ 19:56)    Dr. Joel Klein PGY1

## 2020-11-30 NOTE — PROGRESS NOTE ADULT - ASSESSMENT
A/P: 31 y/o , POD#4 s/p pLTCS for sPEC at 35+wks. Patient admitted to SICU for BP control with a cardine infusion due to maxing out on IV antihypertensives within a 24 hour period, now transitioned to the OB floor. Patient's labs concerning for HELLP syndrome as well as nephrotic range proteinuria. Creatinine wnl. She is currently without symx of PEC. Plts now downtrending, AST/ALT and LDH uptrending, PTT elevated and uptrending. Likely HELLP, but differential includes TTP, HUS, and AFLP. Will consult hematology and consider restarting Mg for seizure ppx.    # HELLP  - plts currently 33, no bleeding.   - Hematology consulted, recs pending  - Consider restarting Mg for seizure ppx  - c/w procardia 90  - s/p cardene drip  - Hold HSQ  - maintain active T+S  - Appreciate CardioOb recs  - TTE pending    # nephrotic range proteinuria  - appreciate nephrology recs  - renal sono with doppler pending  - f/u labs  - HSQ held for thrombocytopenia, c/w SCDs    #Postpartum  - COVID neg ()  - Reg diet  - Oxycodone    d/w Dr. Varela  d/w Dr. Naty Wagner PGY3

## 2020-11-30 NOTE — CONSULT NOTE ADULT - SUBJECTIVE AND OBJECTIVE BOX
History of Present Illness: 30yFemale POD#3 following  for severe pre-eclampsia. Patient developed worsening pre-eclampsia requiring SICU admission for blood pressure control following delivery as well as HELLP syndrome. Patient continues to have nephrotic range proteinuria and is referred to IR for kidney biopsy.    Allergies:   No Known Allergies    Medications (Antibiotics/Cardiovascular/Anticoagulants/Blood Products):   heparin   Injectable: 5000 Unit(s) SubCutaneous (20 @ 05:59)  NIFEdipine IR: 20 milliGRAM(s) Oral (20 @ 14:20)  NIFEdipine IR: 10 milliGRAM(s) Oral (20 @ 13:44)  NIFEdipine XL: 90 milliGRAM(s) Oral (20 @ 22:38)  NIFEdipine XL: 60 milliGRAM(s) Oral (20 @ 22:25)    Vital Signs:  T(F): 98 (20 @ 16:55), Max: 99 (20 @ 12:34)  HR: 89 (20 @ 16:55) (82 - 95)  BP: 134/85 (20 @ 16:55) (119/82 - 141/83)  RR: 18 (20 @ 16:55) (18 - 18)  SpO2: 98% (20 @ 16:55) (95% - 98%)    Relevant Lab Results:            11.7  13.73)-----(35     (20 @ 12:52)         34.4     137 | 104 | 16  --------------------< 95     (20 @ 12:52)  4.8 | 24 | 0.78       PT: 9.8<L> 20 @ 12:52  aPTT: 57.4<H> 20 @ 12:52   INR: 0.81<L> 20 @ 12:52

## 2020-11-30 NOTE — PROGRESS NOTE ADULT - PROBLEM SELECTOR PLAN 1
Patient with proteinuria likely in the setting of pre-eclampsia however suspicion for nephrotic syndrome   - F/u PEDRO PABLO, dsDNA, Anti-Sm, APLS, EBV, Parvo, HIV, Hepatitis B/C, Complements c3 and c4, PLA2R, SPEP/Iimmunofixation/free light chains, RPR, [id number].  - F/u  Renal US with Duplex of renal artery and vein to rule/out RVT.  - Will need IR guided kidney Biopsy (please check w/ IR/heme given plt 33)   - Lipid Panel: hypercholesterolemia: would recommend Lipitor 80mg but weigh risks/benefits if patient wishes to breastfeed as statins can pass to baby    - Would recommend Bumex 1mg bid but if pt wishes to breastfeed, can hold as it can decrease breast milk production.

## 2020-11-30 NOTE — PROGRESS NOTE ADULT - ASSESSMENT
30 year old  female with no pertinent past medical history who presented to the hospital with elevated BP, low platelets and elevated liver enzymes requiring  delivery at 35w3d found to have continued hypertension and proteinuria concerning for nephrotic syndrome

## 2020-11-30 NOTE — CHART NOTE - NSCHARTNOTEFT_GEN_A_CORE
R3 Chart Note    Patient seen and examined for downtrending platelets, uptrending transaminases, and uptrending markers of hemolysis  Patient denies headache, blurry vision, shortness of breath, epigastric pain, nausea, or vomiting.   Reports swelling has significantly improved   Nosebleed has resolved    Vital Signs Last 24 Hrs  T(C): 36.9 (2020 00:44), Max: 37.2 (2020 05:22)  T(F): 98.5 (2020 00:44), Max: 98.9 (2020 05:22)  HR: 91 (2020 00:44) (68 - 92)  BP: 132/82 (2020 00:44) (124/80 - 166/94)  BP(mean): --  RR: 18 (:44) (18 - 18)  SpO2: 95% (:44) (95% - 99%)    GEN: NAD  RESP: CTABL  CV: RRR  Abd: soft, nontender, fundus firm below umbilicus. No ruq or epigastric tenderness  Ext: b/l symmetric, 2+ pitting edema  Neuro: DTR 2+ bilaterally (brachioradialis, patellar, dorsalis pedis)     A/P: 29 y/o , POD#3 s/p pLTCS for sPEC at 35+wks. Patient admitted to SICU for BP control with a cardine infusion due to maxing out on IV antihypertensives within a 24 hour period, now transitioned to the OB floor. Patient's labs concerning for HELLP syndrome as well as nephrotic range proteinuria. Creatinine wnl. She is currently without symx of PEC. Patient's platelets now again downtrending and transaminases uptrending as well as LDH.     - Repeat HELLP labs stat  - continue to avoid NSAIDs and Tylenol  - c/w supportive care    Kristina Wagner PGY3 R3 Chart Note    Patient seen and examined for downtrending platelets, uptrending transaminases, and uptrending markers of hemolysis  Patient denies headache, blurry vision, shortness of breath, epigastric pain, nausea, or vomiting.   Reports swelling has significantly improved   Nosebleed has resolved    Vital Signs Last 24 Hrs  T(C): 36.9 (2020 00:44), Max: 37.2 (2020 05:22)  T(F): 98.5 (2020 00:44), Max: 98.9 (2020 05:22)  HR: 91 (:44) (68 - 92)  BP: 132/82 (:44) (124/80 - 166/94)  BP(mean): --  RR: 18 (:44) (18 - 18)  SpO2: 95% (:44) (95% - 99%)    GEN: NAD  RESP: CTABL  CV: RRR  Abd: soft, nontender, fundus firm below umbilicus. No ruq or epigastric tenderness. Transverse incision clean, dry, intact  Ext: b/l symmetric, 2+ pitting edema  Neuro: DTR 2+ bilaterally (brachioradialis, patellar, dorsalis pedis)   Pelvic: lochia wnl    A/P: 31 y/o , POD#3 s/p pLTCS for sPEC at 35+wks. Patient admitted to SICU for BP control with a cardine infusion due to maxing out on IV antihypertensives within a 24 hour period, now transitioned to the OB floor. Patient's labs concerning for HELLP syndrome as well as nephrotic range proteinuria. Creatinine wnl. She is currently without symx of PEC. Patient's platelets now again downtrending and transaminases uptrending as well as LDH.     - Repeat HELLP labs stat  - continue to avoid NSAIDs and Tylenol  - c/w supportive care    Kristina Wagner PGY3

## 2020-11-30 NOTE — CONSULT NOTE ADULT - SUBJECTIVE AND OBJECTIVE BOX
Hematology Consult Note    HPI:  31yo  @ 35w 3 d presents from the office with elevated BP for R/O PEC, pt with new onset elevated BP in the office today 150/90.  Pt denies HA, visual changes or epigastric/RUQ pain.  Pt does states that she has alot of LE edema and also swelling of her abdomen and was told she had fluid on ultrasound outside the uterus.  Pt denies contractions, VB or LOF has + FM  15lb weight gain in < 1 week      Allergies  No Known Allergies      MEDICATIONS  (STANDING):  ferrous    sulfate 325 milliGRAM(s) Oral daily  NIFEdipine XL 90 milliGRAM(s) Oral at bedtime  prenatal multivitamin 1 Tablet(s) Oral daily    MEDICATIONS  (PRN):  HYDROmorphone  Injectable 0.5 milliGRAM(s) IV Push once PRN Breakthrough Pain  lanolin Ointment 1 Application(s) Topical every 6 hours PRN Sore Nipples  magnesium hydroxide Suspension 30 milliLiter(s) Oral two times a day PRN Constipation  naloxone Injectable 0.1 milliGRAM(s) IV Push every 3 minutes PRN For ANY of the following changes in patient status:  A. Breaths Per Minute LESS THAN 10, B. Oxygen saturation LESS THAN 90%, C. Sedation score of 6 for Stop After: 4 Times  oxyCODONE    IR 5 milliGRAM(s) Oral every 4 hours PRN Severe Pain (7 - 10)  simethicone 80 milliGRAM(s) Chew every 4 hours PRN Gas      PAST MEDICAL & SURGICAL HISTORY:  Asthma in child    No pertinent past medical history        H/O dilation and curettage        FAMILY HISTORY:  No pertinent family history in first degree relatives        SOCIAL HISTORY: Quit tobacco prior to pregnancy. No EtOH or illicit drug use.    REVIEW OF SYSTEMS:    CONSTITUTIONAL: No weakness, fevers or chills  EYES/ENT: No visual changes;  No vertigo or throat pain   NECK: No pain or stiffness  RESPIRATORY: No cough, wheezing, hemoptysis; No shortness of breath  CARDIOVASCULAR: No chest pain or palpitations  GASTROINTESTINAL: No abdominal or epigastric pain. No nausea, vomiting, or hematemesis; No diarrhea or constipation. No melena or hematochezia.  GENITOURINARY: No dysuria, frequency or hematuria  NEUROLOGICAL: No numbness or weakness  SKIN: No itching, burning, rashes, or lesions   All other review of systems is negative unless indicated above.        T(F): 98.6 (20 @ 08:28), Max: 98.6 (20 @ 13:15)  HR: 94 (20 @ 08:28)  BP: 119/82 (20 @ 08:28)  RR: 18 (20 @ 08:28)  SpO2: 95% (20 @ 08:28)  Wt(kg): --    GENERAL: NAD, well-developed  HEAD:  Atraumatic, Normocephalic  EYES: EOMI, PERRLA, conjunctiva and sclera clear  NECK: Supple, No JVD  CHEST/LUNG: Clear to auscultation bilaterally; No wheeze  HEART: Regular rate and rhythm; No murmurs, rubs, or gallops  ABDOMEN: Soft, Nontender, Nondistended; Bowel sounds present  EXTREMITIES:  2+ Peripheral Pulses, No clubbing, cyanosis, or edema  NEUROLOGY: non-focal  SKIN: No rashes or lesions                          11.2   12.47 )-----------( 33       ( 2020 05:44 )             33.5           137  |  104  |  17  ----------------------------<  80  4.6   |  25  |  0.86    Ca    8.1<L>      2020 05:44  Phos  2.9       Mg     6.5         TPro  4.9<L>  /  Alb  2.3<L>  /  TBili  1.1  /  DBili  x   /  AST  183<H>  /  ALT  120<H>  /  AlkPhos  126<H>        Uric Acid, Serum: 7.7 mg/dL ( @ 05:44)  Lactate Dehydrogenase, Serum: 643 U/L ( 05:44)  Uric Acid, Serum: 8.0 mg/dL ( @ 01:37)  Lactate Dehydrogenase, Serum: 722 U/L ( @ 01:37)  Uric Acid, Serum: 8.5 mg/dL ( @ 18:36)  Phosphorus Level, Serum: 2.9 mg/dL ( @ 18:36)  Lactate Dehydrogenase, Serum: 752 U/L ( @ 18:36)      Radiology & Additional Testing: Hematology Consult Note    HPI:  30F w/ no pertinent past medical history initially presented with acute hypertension noted at the OB office, /90. She developed swelling of her legs about 2.5 wks prior to admission. She was also told that she had fluid outside the uterus based on ultrasound. Pt now POD#4 s/p LTCS. She was briefly in SICU for hypertension requiring Cardene gtt, but BP now well controlled on Procardia.    Hematology consulted for worsening thrombocytopenia. Platelets have been steadily downtrending since admission. LDH, fibrinogen, and uric acid elevated. [id number] assay and APLS labs were sent; results pending. Pt reports abdominal pain from the  and swelling of both arms and legs. She denies headaches, vision changes, dyspnea, chest pain, palpitations, muscle weakness, hematuria, hematochezia, and melena.      Allergies  No Known Allergies      MEDICATIONS  (STANDING):  ferrous    sulfate 325 milliGRAM(s) Oral daily  NIFEdipine XL 90 milliGRAM(s) Oral at bedtime  prenatal multivitamin 1 Tablet(s) Oral daily    MEDICATIONS  (PRN):  HYDROmorphone  Injectable 0.5 milliGRAM(s) IV Push once PRN Breakthrough Pain  lanolin Ointment 1 Application(s) Topical every 6 hours PRN Sore Nipples  magnesium hydroxide Suspension 30 milliLiter(s) Oral two times a day PRN Constipation  naloxone Injectable 0.1 milliGRAM(s) IV Push every 3 minutes PRN For ANY of the following changes in patient status:  A. Breaths Per Minute LESS THAN 10, B. Oxygen saturation LESS THAN 90%, C. Sedation score of 6 for Stop After: 4 Times  oxyCODONE    IR 5 milliGRAM(s) Oral every 4 hours PRN Severe Pain (7 - 10)  simethicone 80 milliGRAM(s) Chew every 4 hours PRN Gas      PAST MEDICAL & SURGICAL HISTORY:  Asthma in child  No pertinent past medical history    H/O dilation and curettage    FAMILY HISTORY:  No pertinent family history in first degree relatives    SOCIAL HISTORY: Quit tobacco prior to pregnancy. No EtOH or illicit drug use.    REVIEW OF SYSTEMS:  CONSTITUTIONAL: No weakness, fevers or chills  EYES/ENT: No visual changes;  No vertigo or throat pain   NECK: No pain or stiffness  RESPIRATORY: No cough, wheezing, hemoptysis; No shortness of breath  CARDIOVASCULAR: +Swelling of both arms and legs. No chest pain or palpitations  GASTROINTESTINAL: No abdominal or epigastric pain. No nausea, vomiting, or hematemesis; No diarrhea or constipation. No melena or hematochezia.  GENITOURINARY: No dysuria, frequency or hematuria  NEUROLOGICAL: No numbness or weakness  SKIN: No itching, burning, rashes, or lesions   All other review of systems is negative unless indicated above.    T(F): 98.6 (20 @ 08:28), Max: 98.6 (20 @ 13:15)  HR: 94 (20 @ 08:28)  BP: 119/82 (20 @ 08:28)  RR: 18 (20 @ 08:28)  SpO2: 95% (20 @ 08:28)  Wt(kg): --    GENERAL: NAD, well-developed  HEAD:  Atraumatic, Normocephalic  CHEST/LUNG: Clear to auscultation bilaterally; No wheeze  HEART: Regular rate and rhythm; No murmurs, rubs, or gallops  ABDOMEN: Soft, non-tender. incision from LTCS intact  EXTREMITIES: Non-pitting edema of b/l UE. 2+ pitting edema b/l LE. 2+ Peripheral Pulses, No clubbing or cyanosis.  NEUROLOGY: non-focal  SKIN: No rashes or lesions                          11.2   12.47 )-----------( 33       ( 2020 05:44 )             33.5           137  |  104  |  17  ----------------------------<  80  4.6   |  25  |  0.86    Ca    8.1<L>      2020 05:44  Phos  2.9       Mg     6.5         TPro  4.9<L>  /  Alb  2.3<L>  /  TBili  1.1  /  DBili  x   /  AST  183<H>  /  ALT  120<H>  /  AlkPhos  126<H>        Uric Acid, Serum: 7.7 mg/dL ( @ 05:44)  Lactate Dehydrogenase, Serum: 643 U/L ( @ 05:44)  Uric Acid, Serum: 8.0 mg/dL ( @ 01:37)  Lactate Dehydrogenase, Serum: 722 U/L ( @ 01:37)  Uric Acid, Serum: 8.5 mg/dL ( @ 18:36)  Phosphorus Level, Serum: 2.9 mg/dL ( @ 18:36)  Lactate Dehydrogenase, Serum: 752 U/L ( @ 18:36)      Radiology & Additional Testing:  < from: US Duplex Venous Upper Ext Complete, Bilateral (20 @ 16:40) >  FINDINGS:    There is normal compressibility of the bilateral common femoral, femoral and popliteal veins.  Doppler examination shows normal spontaneous and phasic flow.    No calf vein thrombosis is detected.    IMPRESSION:  No evidence of deep venous thrombosis in either lower extremity.    MARIA ISABEL TERRAZAS MD; Attending Radiologist   This document has been electronically signed. 2020  4:46PM    < end of copied text >   Hematology Consult Note    HPI:  30F w/ no pertinent past medical history initially presented with acute hypertension noted at the OB office, /90. She developed swelling of her legs about 2.5 wks prior to admission. She was also told that she had fluid outside the uterus based on ultrasound. Pt now POD#4 s/p LTCS. She was briefly in SICU for hypertension requiring Cardene gtt, but BP now well controlled on Procardia.    Hematology consulted for worsening thrombocytopenia. Platelets have been steadily downtrending since admission. LDH, fibrinogen, and uric acid elevated. [id number] assay and APLS labs were sent; results pending. Pt reports abdominal pain from the  and swelling of both arms and legs. She denies headaches, vision changes, dyspnea, chest pain, palpitations, muscle weakness, hematuria, hematochezia, and melena.      Allergies  No Known Allergies      MEDICATIONS  (STANDING):  ferrous    sulfate 325 milliGRAM(s) Oral daily  NIFEdipine XL 90 milliGRAM(s) Oral at bedtime  prenatal multivitamin 1 Tablet(s) Oral daily    MEDICATIONS  (PRN):  HYDROmorphone  Injectable 0.5 milliGRAM(s) IV Push once PRN Breakthrough Pain  lanolin Ointment 1 Application(s) Topical every 6 hours PRN Sore Nipples  magnesium hydroxide Suspension 30 milliLiter(s) Oral two times a day PRN Constipation  naloxone Injectable 0.1 milliGRAM(s) IV Push every 3 minutes PRN For ANY of the following changes in patient status:  A. Breaths Per Minute LESS THAN 10, B. Oxygen saturation LESS THAN 90%, C. Sedation score of 6 for Stop After: 4 Times  oxyCODONE    IR 5 milliGRAM(s) Oral every 4 hours PRN Severe Pain (7 - 10)  simethicone 80 milliGRAM(s) Chew every 4 hours PRN Gas      PAST MEDICAL & SURGICAL HISTORY:  Asthma in child  No pertinent past medical history    H/O dilation and curettage    FAMILY HISTORY:  No pertinent family history in first degree relatives    SOCIAL HISTORY: Quit tobacco prior to pregnancy. No EtOH or illicit drug use.    REVIEW OF SYSTEMS:  CONSTITUTIONAL: No weakness, fevers or chills  EYES/ENT: No visual changes;  No vertigo or throat pain   NECK: No pain or stiffness  RESPIRATORY: No cough, wheezing, hemoptysis; No shortness of breath  CARDIOVASCULAR: +Swelling of both arms and legs. No chest pain or palpitations  GASTROINTESTINAL: No abdominal or epigastric pain. No nausea, vomiting, or hematemesis; No diarrhea or constipation. No melena or hematochezia.  GENITOURINARY: No dysuria, frequency or hematuria  NEUROLOGICAL: No numbness or weakness  SKIN: No itching, burning, rashes, or lesions   All other review of systems is negative unless indicated above.    T(F): 98.6 (20 @ 08:28), Max: 98.6 (20 @ 13:15)  HR: 94 (20 @ 08:28)  BP: 119/82 (20 @ 08:28)  RR: 18 (20 @ 08:28)  SpO2: 95% (20 @ 08:28)  Wt(kg): --    GENERAL: NAD, well-developed  HEAD:  Atraumatic, Normocephalic  CHEST/LUNG: Clear to auscultation bilaterally; No wheeze  HEART: Regular rate and rhythm; No murmurs, rubs, or gallops  ABDOMEN: Soft, non-tender. incision from LTCS intact  EXTREMITIES: Non-pitting edema of b/l UE. 2+ pitting edema b/l LE. 2+ Peripheral Pulses, No clubbing or cyanosis.  NEUROLOGY: non-focal  SKIN: No rashes or lesions                          11.2   12.47 )-----------( 33       ( 2020 05:44 )             33.5           137  |  104  |  17  ----------------------------<  80  4.6   |  25  |  0.86    Ca    8.1<L>      2020 05:44  Phos  2.9       Mg     6.5         TPro  4.9<L>  /  Alb  2.3<L>  /  TBili  1.1  /  DBili  x   /  AST  183<H>  /  ALT  120<H>  /  AlkPhos  126<H>        Uric Acid, Serum: 7.7 mg/dL ( @ 05:44)  Lactate Dehydrogenase, Serum: 643 U/L ( @ 05:44)  Uric Acid, Serum: 8.0 mg/dL ( @ 01:37)  Lactate Dehydrogenase, Serum: 722 U/L ( @ 01:37)  Uric Acid, Serum: 8.5 mg/dL ( @ 18:36)  Phosphorus Level, Serum: 2.9 mg/dL ( @ 18:36)  Lactate Dehydrogenase, Serum: 752 U/L ( @ 18:36)      Radiology & Additional Testing:  Manual examination of blood smear shows 1 - 3 schistocytes per HPF    < from: US Duplex Venous Upper Ext Complete, Bilateral (. @ 16:40) >  FINDINGS:    There is normal compressibility of the bilateral common femoral, femoral and popliteal veins.  Doppler examination shows normal spontaneous and phasic flow.    No calf vein thrombosis is detected.    IMPRESSION:  No evidence of deep venous thrombosis in either lower extremity.    MARIA ISABEL TERRAZAS MD; Attending Radiologist   This document has been electronically signed. 2020  4:46PM    < end of copied text >   Hematology Consult Note    HPI:  30F w/ no pertinent past medical history initially presented with acute hypertension noted at the OB office, /90. She developed swelling of her legs about 2.5 wks prior to admission. She was also told that she had fluid outside the uterus based on ultrasound. Pt now POD#4 s/p LTCS. She was briefly in SICU for hypertension requiring Cardene gtt, but BP now well controlled on Procardia.    Hematology consulted for worsening thrombocytopenia. Platelets have been steadily downtrending since admission. LDH elevated, haptoglobin pending, total bilirubin on the higher end of normal range, and absolute retic count is not elevated. [id number] assay and APLS labs were sent; results pending. Pt reports abdominal pain from the  and swelling of both arms and legs. She denies headaches, vision changes, dyspnea, chest pain, palpitations, muscle weakness, hematuria, hematochezia, and melena.      Allergies  No Known Allergies      MEDICATIONS  (STANDING):  ferrous    sulfate 325 milliGRAM(s) Oral daily  NIFEdipine XL 90 milliGRAM(s) Oral at bedtime  prenatal multivitamin 1 Tablet(s) Oral daily    MEDICATIONS  (PRN):  HYDROmorphone  Injectable 0.5 milliGRAM(s) IV Push once PRN Breakthrough Pain  lanolin Ointment 1 Application(s) Topical every 6 hours PRN Sore Nipples  magnesium hydroxide Suspension 30 milliLiter(s) Oral two times a day PRN Constipation  naloxone Injectable 0.1 milliGRAM(s) IV Push every 3 minutes PRN For ANY of the following changes in patient status:  A. Breaths Per Minute LESS THAN 10, B. Oxygen saturation LESS THAN 90%, C. Sedation score of 6 for Stop After: 4 Times  oxyCODONE    IR 5 milliGRAM(s) Oral every 4 hours PRN Severe Pain (7 - 10)  simethicone 80 milliGRAM(s) Chew every 4 hours PRN Gas      PAST MEDICAL & SURGICAL HISTORY:  Asthma in child  No pertinent past medical history    H/O dilation and curettage    FAMILY HISTORY:  No pertinent family history in first degree relatives    SOCIAL HISTORY: Quit tobacco prior to pregnancy. No EtOH or illicit drug use.    REVIEW OF SYSTEMS:  CONSTITUTIONAL: No weakness, fevers or chills  EYES/ENT: No visual changes;  No vertigo or throat pain   NECK: No pain or stiffness  RESPIRATORY: No cough, wheezing, hemoptysis; No shortness of breath  CARDIOVASCULAR: +Swelling of both arms and legs. No chest pain or palpitations  GASTROINTESTINAL: No abdominal or epigastric pain. No nausea, vomiting, or hematemesis; No diarrhea or constipation. No melena or hematochezia.  GENITOURINARY: No dysuria, frequency or hematuria  NEUROLOGICAL: No numbness or weakness  SKIN: No itching, burning, rashes, or lesions   All other review of systems is negative unless indicated above.    T(F): 98.6 (20 @ 08:28), Max: 98.6 (20 @ 13:15)  HR: 94 (20 @ 08:28)  BP: 119/82 (20 @ 08:28)  RR: 18 (20 @ 08:28)  SpO2: 95% (20 @ 08:28)  Wt(kg): --    GENERAL: NAD, well-developed  HEAD:  Atraumatic, Normocephalic  CHEST/LUNG: Clear to auscultation bilaterally; No wheeze  HEART: Regular rate and rhythm; No murmurs, rubs, or gallops  ABDOMEN: Soft, non-tender. incision from LTCS intact  EXTREMITIES: Non-pitting edema of b/l UE. 2+ pitting edema b/l LE. 2+ Peripheral Pulses, No clubbing or cyanosis.  NEUROLOGY: non-focal  SKIN: No rashes or lesions                          11.2   12.47 )-----------( 33       ( 2020 05:44 )             33.5           137  |  104  |  17  ----------------------------<  80  4.6   |  25  |  0.86    Ca    8.1<L>      2020 05:44  Phos  2.9       Mg     6.5         TPro  4.9<L>  /  Alb  2.3<L>  /  TBili  1.1  /  DBili  x   /  AST  183<H>  /  ALT  120<H>  /  AlkPhos  126<H>        Uric Acid, Serum: 7.7 mg/dL ( @ 05:44)  Lactate Dehydrogenase, Serum: 643 U/L ( @ 05:44)  Uric Acid, Serum: 8.0 mg/dL ( @ 01:37)  Lactate Dehydrogenase, Serum: 722 U/L ( @ 01:37)  Uric Acid, Serum: 8.5 mg/dL ( @ 18:36)  Phosphorus Level, Serum: 2.9 mg/dL ( @ 18:36)  Lactate Dehydrogenase, Serum: 752 U/L ( @ 18:36)      Radiology & Additional Testing:  Manual examination of blood smear shows 1 - 3 schistocytes per HPF    < from: US Duplex Venous Upper Ext Complete, Bilateral (20 @ 16:40) >  FINDINGS:    There is normal compressibility of the bilateral common femoral, femoral and popliteal veins.  Doppler examination shows normal spontaneous and phasic flow.    No calf vein thrombosis is detected.    IMPRESSION:  No evidence of deep venous thrombosis in either lower extremity.    MARIA ISABEL TERRAZAS MD; Attending Radiologist   This document has been electronically signed. 2020  4:46PM    < end of copied text >

## 2020-11-30 NOTE — PROGRESS NOTE ADULT - SUBJECTIVE AND OBJECTIVE BOX
NIMO ELLIS    R3 PP Note, POD#4    Interval events: Patient seen and reassessed at approx 3a s/p repeat labs showing continued downtrend in platelets as well as 530a. Patient is without complaints. Reports swelling has improved. Vaginal bleeding wnl.     Patient seen and examined at bedside. No acute complaints.   Pt denies HA, epigastric pain, blurred vision, CP, SOB, N/V, fevers, and chills.  Patient is ambulating, tolerating a regular diet, voiding spontaneously, and passing flatus.    Vital Signs Last 24 Hours  T(C): 36.8 (11-30-20 @ 05:20), Max: 37 (11-29-20 @ 13:15)  HR: 84 (11-30-20 @ 05:20) (72 - 92)  BP: 122/83 (11-30-20 @ 05:20) (122/83 - 166/94)  RR: 18 (11-30-20 @ 05:20) (18 - 18)  SpO2: 97% (11-30-20 @ 05:20) (95% - 99%)    CAPILLARY BLOOD GLUCOSE          Physical Exam:  General: NAD  Abdomen: Soft, non-tender. Fundus firm at umbilicus. Transverse incision clean, dry, intact.   Ext: No pain. RUE swelling decreased. 2+ pitting edema b/l.   Pelvic: lochia wnl      Labs:             11.2   12.47 )-----------( 33       ( 11-30 @ 05:44 )             33.5     11-30 @ 05:44    137  |  104  |  17  ----------------------------<  80  4.6   |  25  |  0.86    Ca    8.1      11-30 @ 05:44  Phos  2.9     11-29 @ 18:36  Mg     6.5     11-28 @ 16:11    TPro  4.9  /  Alb  2.3  /  TBili  1.1  /  DBili  x   /  AST  183  /  ALT  120  /  AlkPhos  126  11-30 @ 05:44    PT/INR - ( 11-30 @ 05:44 )   PT: 10.2 sec;   INR: 0.84 ratio    PTT - ( 11-30 @ 05:44 )  PTT:55.8 sec    Uric Acid: (11-30 @ 05:44)  7.7      Fibrinogen: (11-30 @ 05:44)  984      LDH: (11-30 @ 05:44)  643        MEDICATIONS  (STANDING):  ferrous    sulfate 325 milliGRAM(s) Oral daily  NIFEdipine XL 90 milliGRAM(s) Oral at bedtime  prenatal multivitamin 1 Tablet(s) Oral daily    MEDICATIONS  (PRN):  HYDROmorphone  Injectable 0.5 milliGRAM(s) IV Push once PRN Breakthrough Pain  lanolin Ointment 1 Application(s) Topical every 6 hours PRN Sore Nipples  magnesium hydroxide Suspension 30 milliLiter(s) Oral two times a day PRN Constipation  naloxone Injectable 0.1 milliGRAM(s) IV Push every 3 minutes PRN For ANY of the following changes in patient status:  A. Breaths Per Minute LESS THAN 10, B. Oxygen saturation LESS THAN 90%, C. Sedation score of 6 for Stop After: 4 Times  oxyCODONE    IR 5 milliGRAM(s) Oral every 4 hours PRN Severe Pain (7 - 10)  simethicone 80 milliGRAM(s) Chew every 4 hours PRN Gas

## 2020-11-30 NOTE — PROGRESS NOTE ADULT - PROBLEM SELECTOR PLAN 2
- On nifedipine 90 QD; given significant edema, there is question if known edema side effect profile of nifedipine is confounding pure nephrotic syndrome.   - can d/c and switch to losartan 100mg if pt does not want to breastfeed; if she does wish to breastfeed, can continue w/ current regimen

## 2020-11-30 NOTE — CONSULT NOTE ADULT - ASSESSMENT
30F w/ no pertinent past medical history initially presented with acute hypertension concerning for severe pre-eclampsia but found to have evidence of hemolysis, transaminitis, and thrombocytopenia suggestive of HELLP syndrome. Hematology consulted for worsening thrombocytopenia.    Recommendations:  - Most likely HELLP syndrome, but platelets still downtrending on POD#4  - DDx also includes TTP and HUS  - Monitor AST/ALT and hemolysis labs (LDH, haptoglobin, bilirubin, retic count) daily  - f/u ADAMTS 13 assay and APLS labs    ***Note is incomplete. Will discuss plan with fellow and attending. 30F w/ no pertinent past medical history initially presented with acute hypertension concerning for severe pre-eclampsia but found to have evidence of hemolysis, transaminitis, and thrombocytopenia suggestive of HELLP syndrome. Hematology consulted for worsening thrombocytopenia.    Recommendations:  - Most likely HELLP syndrome, but platelets still downtrending on POD#4  - DDx also includes TTP and HUS  - Monitor CBC, AST/ALT and hemolysis labs (LDH, haptoglobin, bilirubin, retic count) q12h  - f/u ADAMTS 13 assay and APLS labs    ***Note is incomplete. Will discuss plan with fellow and attending. 30F w/ no pertinent past medical history initially presented with acute hypertension concerning for severe pre-eclampsia but found to have evidence of hemolysis, transaminitis, and thrombocytopenia suggestive of HELLP syndrome. Hematology consulted for worsening thrombocytopenia.    Recommendations:  - Most likely HELLP syndrome, but platelets still downtrending on POD#4. DDx also includes TTP and HUS  - Blood smear showing 1 - 3 schistocytes per HPF, but does not differentiate between HELLP/TTP/HUS  - dRVVT likely false positive for lupus anticoagulant as pt was on subQ heparin up until this morning. SCT was negative for LA.  - Monitor CBC, AST/ALT and hemolysis labs (LDH, haptoglobin, bilirubin, retic count) q12h  - f/u ADAMTS 13 assay and APLS labs  - Will continue to follow    ***Note is incomplete. Will discuss plan with fellow and attending. 30F w/ no pertinent past medical history initially presented with acute hypertension concerning for severe pre-eclampsia but found to have evidence of hemolysis, transaminitis, and thrombocytopenia suggestive of HELLP syndrome. Now POD#4 s/p LTCS. Hematology consulted for worsening thrombocytopenia.    Recommendations:  - Most likely HELLP syndrome, but platelets still downtrending on POD#4. DDx also includes TTP and HUS  - Blood smear showing 1 - 3 schistocytes per HPF, but does not differentiate between HELLP/TTP/HUS  - dRVVT likely false positive for lupus anticoagulant as pt was on subQ heparin up until this morning. SCT was negative for LA.  - Monitor CBC, AST/ALT and hemolysis labs (LDH, haptoglobin, bilirubin, retic count) daily  - f/u ADAMTS 13 assay and APLS labs  - Will continue to follow    Plan d/w Dr. Kristie Mckeon MD  Internal Medicine PGY-2  [phone] / [phone] 30F w/ no pertinent past medical history initially presented with acute hypertension concerning for severe pre-eclampsia but found to have evidence of hemolysis, transaminitis, and thrombocytopenia suggestive of HELLP syndrome. Now POD#4 s/p LTCS. Hematology consulted for worsening thrombocytopenia.    Recommendations:  - Most likely HELLP syndrome, but platelets still downtrending on POD#4. DDx also includes TTP and HUS  - Blood smear showing 1 - 3 schistocytes per HPF, but does not differentiate between HELLP/TTP/HUS  - dRVVT likely false positive for lupus anticoagulant as pt was on subQ heparin up until this morning. SCT was negative for LA.  - Monitor CBC, AST/ALT and hemolysis labs (LDH, haptoglobin, bilirubin, retic count) daily  - f/u ADAMTS 13 assay and APLS labs  - If requiring renal biopsy, transfuse platelets to goal >= 50. Low suspicion for TTP.  - Will continue to follow    Plan d/w Dr. Kristie Mckeon MD  Internal Medicine PGY-2  [phone] / [phone]

## 2020-12-01 LAB
ALBUMIN SERPL ELPH-MCNC: 2.3 G/DL — LOW (ref 3.3–5)
ALBUMIN SERPL ELPH-MCNC: 2.3 G/DL — LOW (ref 3.3–5)
ALBUMIN SERPL ELPH-MCNC: 2.4 G/DL — LOW (ref 3.3–5)
ALP SERPL-CCNC: 130 U/L — HIGH (ref 40–120)
ALP SERPL-CCNC: 140 U/L — HIGH (ref 40–120)
ALP SERPL-CCNC: 143 U/L — HIGH (ref 40–120)
ALT FLD-CCNC: 105 U/L — HIGH (ref 10–45)
ALT FLD-CCNC: 85 U/L — HIGH (ref 10–45)
ALT FLD-CCNC: 97 U/L — HIGH (ref 10–45)
ANA TITR SER: NEGATIVE — SIGNIFICANT CHANGE UP
ANION GAP SERPL CALC-SCNC: 10 MMOL/L — SIGNIFICANT CHANGE UP (ref 5–17)
ANION GAP SERPL CALC-SCNC: 10 MMOL/L — SIGNIFICANT CHANGE UP (ref 5–17)
ANION GAP SERPL CALC-SCNC: 9 MMOL/L — SIGNIFICANT CHANGE UP (ref 5–17)
APTT BLD: 33 SEC — SIGNIFICANT CHANGE UP (ref 27.5–35.5)
APTT BLD: 37.6 SEC — HIGH (ref 27.5–35.5)
AST SERPL-CCNC: 100 U/L — HIGH (ref 10–40)
AST SERPL-CCNC: 115 U/L — HIGH (ref 10–40)
AST SERPL-CCNC: 85 U/L — HIGH (ref 10–40)
BASOPHILS # BLD AUTO: 0.03 K/UL — SIGNIFICANT CHANGE UP (ref 0–0.2)
BASOPHILS # BLD AUTO: 0.04 K/UL — SIGNIFICANT CHANGE UP (ref 0–0.2)
BASOPHILS NFR BLD AUTO: 0.3 % — SIGNIFICANT CHANGE UP (ref 0–2)
BASOPHILS NFR BLD AUTO: 0.4 % — SIGNIFICANT CHANGE UP (ref 0–2)
BILIRUB SERPL-MCNC: 0.4 MG/DL — SIGNIFICANT CHANGE UP (ref 0.2–1.2)
BILIRUB SERPL-MCNC: 0.5 MG/DL — SIGNIFICANT CHANGE UP (ref 0.2–1.2)
BILIRUB SERPL-MCNC: 0.8 MG/DL — SIGNIFICANT CHANGE UP (ref 0.2–1.2)
BUN SERPL-MCNC: 15 MG/DL — SIGNIFICANT CHANGE UP (ref 7–23)
BUN SERPL-MCNC: 16 MG/DL — SIGNIFICANT CHANGE UP (ref 7–23)
BUN SERPL-MCNC: 20 MG/DL — SIGNIFICANT CHANGE UP (ref 7–23)
C3 SERPL-MCNC: 121 MG/DL — SIGNIFICANT CHANGE UP (ref 81–157)
C4 SERPL-MCNC: 30 MG/DL — SIGNIFICANT CHANGE UP (ref 13–39)
CALCIUM SERPL-MCNC: 8.1 MG/DL — LOW (ref 8.4–10.5)
CALCIUM SERPL-MCNC: 8.5 MG/DL — SIGNIFICANT CHANGE UP (ref 8.4–10.5)
CALCIUM SERPL-MCNC: 8.6 MG/DL — SIGNIFICANT CHANGE UP (ref 8.4–10.5)
CHLORIDE SERPL-SCNC: 101 MMOL/L — SIGNIFICANT CHANGE UP (ref 96–108)
CHLORIDE SERPL-SCNC: 103 MMOL/L — SIGNIFICANT CHANGE UP (ref 96–108)
CHLORIDE SERPL-SCNC: 103 MMOL/L — SIGNIFICANT CHANGE UP (ref 96–108)
CO2 SERPL-SCNC: 24 MMOL/L — SIGNIFICANT CHANGE UP (ref 22–31)
CO2 SERPL-SCNC: 24 MMOL/L — SIGNIFICANT CHANGE UP (ref 22–31)
CO2 SERPL-SCNC: 26 MMOL/L — SIGNIFICANT CHANGE UP (ref 22–31)
CREAT SERPL-MCNC: 0.82 MG/DL — SIGNIFICANT CHANGE UP (ref 0.5–1.3)
CREAT SERPL-MCNC: 0.84 MG/DL — SIGNIFICANT CHANGE UP (ref 0.5–1.3)
CREAT SERPL-MCNC: 0.96 MG/DL — SIGNIFICANT CHANGE UP (ref 0.5–1.3)
EOSINOPHIL # BLD AUTO: 0.05 K/UL — SIGNIFICANT CHANGE UP (ref 0–0.5)
EOSINOPHIL # BLD AUTO: 0.07 K/UL — SIGNIFICANT CHANGE UP (ref 0–0.5)
EOSINOPHIL NFR BLD AUTO: 0.4 % — SIGNIFICANT CHANGE UP (ref 0–6)
EOSINOPHIL NFR BLD AUTO: 0.7 % — SIGNIFICANT CHANGE UP (ref 0–6)
FIBRINOGEN PPP-MCNC: 1066 MG/DL — HIGH (ref 290–520)
FIBRINOGEN PPP-MCNC: 1068 MG/DL — HIGH (ref 290–520)
FIBRINOGEN PPP-MCNC: 1114 MG/DL — HIGH (ref 290–520)
GLUCOSE SERPL-MCNC: 117 MG/DL — HIGH (ref 70–99)
GLUCOSE SERPL-MCNC: 86 MG/DL — SIGNIFICANT CHANGE UP (ref 70–99)
GLUCOSE SERPL-MCNC: 96 MG/DL — SIGNIFICANT CHANGE UP (ref 70–99)
HCT VFR BLD CALC: 26.7 % — LOW (ref 34.5–45)
HCT VFR BLD CALC: 29 % — LOW (ref 34.5–45)
HGB BLD-MCNC: 8.9 G/DL — LOW (ref 11.5–15.5)
HGB BLD-MCNC: 9.8 G/DL — LOW (ref 11.5–15.5)
IMM GRANULOCYTES NFR BLD AUTO: 0.8 % — SIGNIFICANT CHANGE UP (ref 0–1.5)
IMM GRANULOCYTES NFR BLD AUTO: 0.9 % — SIGNIFICANT CHANGE UP (ref 0–1.5)
INR BLD: 0.83 RATIO — LOW (ref 0.88–1.16)
INR BLD: 0.89 RATIO — SIGNIFICANT CHANGE UP (ref 0.88–1.16)
LDH SERPL L TO P-CCNC: 349 U/L — HIGH (ref 50–242)
LDH SERPL L TO P-CCNC: 438 U/L — HIGH (ref 50–242)
LDH SERPL L TO P-CCNC: 542 U/L — HIGH (ref 50–242)
LYMPHOCYTES # BLD AUTO: 2.6 K/UL — SIGNIFICANT CHANGE UP (ref 1–3.3)
LYMPHOCYTES # BLD AUTO: 24.2 % — SIGNIFICANT CHANGE UP (ref 13–44)
LYMPHOCYTES # BLD AUTO: 3.63 K/UL — HIGH (ref 1–3.3)
LYMPHOCYTES # BLD AUTO: 30.7 % — SIGNIFICANT CHANGE UP (ref 13–44)
MCHC RBC-ENTMCNC: 32.6 PG — SIGNIFICANT CHANGE UP (ref 27–34)
MCHC RBC-ENTMCNC: 32.7 PG — SIGNIFICANT CHANGE UP (ref 27–34)
MCHC RBC-ENTMCNC: 33.3 GM/DL — SIGNIFICANT CHANGE UP (ref 32–36)
MCHC RBC-ENTMCNC: 33.8 GM/DL — SIGNIFICANT CHANGE UP (ref 32–36)
MCV RBC AUTO: 96.3 FL — SIGNIFICANT CHANGE UP (ref 80–100)
MCV RBC AUTO: 98.2 FL — SIGNIFICANT CHANGE UP (ref 80–100)
MONOCYTES # BLD AUTO: 0.78 K/UL — SIGNIFICANT CHANGE UP (ref 0–0.9)
MONOCYTES # BLD AUTO: 0.83 K/UL — SIGNIFICANT CHANGE UP (ref 0–0.9)
MONOCYTES NFR BLD AUTO: 7 % — SIGNIFICANT CHANGE UP (ref 2–14)
MONOCYTES NFR BLD AUTO: 7.3 % — SIGNIFICANT CHANGE UP (ref 2–14)
NEUTROPHILS # BLD AUTO: 7.15 K/UL — SIGNIFICANT CHANGE UP (ref 1.8–7.4)
NEUTROPHILS # BLD AUTO: 7.18 K/UL — SIGNIFICANT CHANGE UP (ref 1.8–7.4)
NEUTROPHILS NFR BLD AUTO: 60.8 % — SIGNIFICANT CHANGE UP (ref 43–77)
NEUTROPHILS NFR BLD AUTO: 66.5 % — SIGNIFICANT CHANGE UP (ref 43–77)
NRBC # BLD: 0 /100 WBCS — SIGNIFICANT CHANGE UP (ref 0–0)
NRBC # BLD: 0 /100 WBCS — SIGNIFICANT CHANGE UP (ref 0–0)
PLATELET # BLD AUTO: 40 K/UL — LOW (ref 150–400)
PLATELET # BLD AUTO: 71 K/UL — LOW (ref 150–400)
POTASSIUM SERPL-MCNC: 4.3 MMOL/L — SIGNIFICANT CHANGE UP (ref 3.5–5.3)
POTASSIUM SERPL-MCNC: 4.5 MMOL/L — SIGNIFICANT CHANGE UP (ref 3.5–5.3)
POTASSIUM SERPL-MCNC: 4.8 MMOL/L — SIGNIFICANT CHANGE UP (ref 3.5–5.3)
POTASSIUM SERPL-SCNC: 4.3 MMOL/L — SIGNIFICANT CHANGE UP (ref 3.5–5.3)
POTASSIUM SERPL-SCNC: 4.5 MMOL/L — SIGNIFICANT CHANGE UP (ref 3.5–5.3)
POTASSIUM SERPL-SCNC: 4.8 MMOL/L — SIGNIFICANT CHANGE UP (ref 3.5–5.3)
PROT SERPL-MCNC: 4.7 G/DL — LOW (ref 6–8.3)
PROT SERPL-MCNC: 4.8 G/DL — LOW (ref 6–8.3)
PROT SERPL-MCNC: 4.9 G/DL — LOW (ref 6–8.3)
PROT SERPL-MCNC: 4.9 G/DL — LOW (ref 6–8.3)
PROT SERPL-MCNC: 5.1 G/DL — LOW (ref 6–8.3)
PROTHROM AB SERPL-ACNC: 10 SEC — LOW (ref 10.6–13.6)
PROTHROM AB SERPL-ACNC: 10.7 SEC — SIGNIFICANT CHANGE UP (ref 10.6–13.6)
RBC # BLD: 2.72 M/UL — LOW (ref 3.8–5.2)
RBC # BLD: 3.01 M/UL — LOW (ref 3.8–5.2)
RBC # FLD: 15.1 % — HIGH (ref 10.3–14.5)
RBC # FLD: 15.3 % — HIGH (ref 10.3–14.5)
SODIUM SERPL-SCNC: 135 MMOL/L — SIGNIFICANT CHANGE UP (ref 135–145)
SODIUM SERPL-SCNC: 137 MMOL/L — SIGNIFICANT CHANGE UP (ref 135–145)
SODIUM SERPL-SCNC: 138 MMOL/L — SIGNIFICANT CHANGE UP (ref 135–145)
URATE SERPL-MCNC: 7.4 MG/DL — HIGH (ref 2.5–7)
URATE SERPL-MCNC: 7.7 MG/DL — HIGH (ref 2.5–7)
URATE SERPL-MCNC: 8.1 MG/DL — HIGH (ref 2.5–7)
WBC # BLD: 10.74 K/UL — HIGH (ref 3.8–10.5)
WBC # BLD: 11.82 K/UL — HIGH (ref 3.8–10.5)
WBC # FLD AUTO: 10.74 K/UL — HIGH (ref 3.8–10.5)
WBC # FLD AUTO: 11.82 K/UL — HIGH (ref 3.8–10.5)

## 2020-12-01 PROCEDURE — 93975 VASCULAR STUDY: CPT | Mod: 26

## 2020-12-01 PROCEDURE — 99232 SBSQ HOSP IP/OBS MODERATE 35: CPT | Mod: GC

## 2020-12-01 PROCEDURE — 99231 SBSQ HOSP IP/OBS SF/LOW 25: CPT

## 2020-12-01 PROCEDURE — 99233 SBSQ HOSP IP/OBS HIGH 50: CPT | Mod: GC

## 2020-12-01 PROCEDURE — 93306 TTE W/DOPPLER COMPLETE: CPT | Mod: 26

## 2020-12-01 RX ADMIN — SIMETHICONE 80 MILLIGRAM(S): 80 TABLET, CHEWABLE ORAL at 22:02

## 2020-12-01 RX ADMIN — MAGNESIUM HYDROXIDE 30 MILLILITER(S): 400 TABLET, CHEWABLE ORAL at 13:23

## 2020-12-01 RX ADMIN — OXYCODONE HYDROCHLORIDE 5 MILLIGRAM(S): 5 TABLET ORAL at 12:10

## 2020-12-01 RX ADMIN — Medication 90 MILLIGRAM(S): at 22:00

## 2020-12-01 RX ADMIN — Medication 325 MILLIGRAM(S): at 13:23

## 2020-12-01 RX ADMIN — Medication 1 TABLET(S): at 13:23

## 2020-12-01 RX ADMIN — OXYCODONE HYDROCHLORIDE 5 MILLIGRAM(S): 5 TABLET ORAL at 09:53

## 2020-12-01 NOTE — PROGRESS NOTE ADULT - SUBJECTIVE AND OBJECTIVE BOX
Dr. Joel BELL:[id number]/NS: [phone]  After 7pm please page [phone] or [phone]    NIMO CORRAL  30y  MRN: [id number]    #Incomplete Note       Subjective:    Patient is a 30y old  Female who presents with a chief complaint of Pre-eclampsia (01 Dec 2020 09:27)      MEDICATIONS  (STANDING):  ferrous    sulfate 325 milliGRAM(s) Oral daily  NIFEdipine XL 90 milliGRAM(s) Oral at bedtime  prenatal multivitamin 1 Tablet(s) Oral daily    MEDICATIONS  (PRN):  HYDROmorphone  Injectable 0.5 milliGRAM(s) IV Push once PRN Breakthrough Pain  lanolin Ointment 1 Application(s) Topical every 6 hours PRN Sore Nipples  magnesium hydroxide Suspension 30 milliLiter(s) Oral two times a day PRN Constipation  naloxone Injectable 0.1 milliGRAM(s) IV Push every 3 minutes PRN For ANY of the following changes in patient status:  A. Breaths Per Minute LESS THAN 10, B. Oxygen saturation LESS THAN 90%, C. Sedation score of 6 for Stop After: 4 Times  oxyCODONE    IR 5 milliGRAM(s) Oral every 4 hours PRN Severe Pain (7 - 10)  simethicone 80 milliGRAM(s) Chew every 4 hours PRN Gas        Objective:    Vitals: Vital Signs Last 24 Hrs  T(C): 36.8 (20 @ 09:58), Max: 37.2 (20 @ 12:34)  T(F): 98.3 (20 @ 09:58), Max: 99 (20 @ 12:34)  HR: 100 (20 @ 09:58) (89 - 100)  BP: 136/79 (20 @ 09:58) (132/86 - 138/90)  BP(mean): 10 (20 @ 21:02) (10 - 10)  RR: 18 (20 @ 09:58) (18 - 18)  SpO2: 98% (20 @ 09:58) (95% - 98%)            I&O's Summary    2020 07:01  -  01 Dec 2020 07:00  --------------------------------------------------------  IN: 0 mL / OUT: 1700 mL / NET: -1700 mL        PHYSICAL EXAM:  GENERAL: NAD, well-groomed, well-developed  HEAD:  Atraumatic, Normocephalic  EYES: EOMI, PERRLA, conjunctiva and sclera clear  ENMT: No tonsillar erythema, exudates, or enlargement; Moist mucous membranes, Good dentition, No lesions  NECK: Supple, No JVD, Normal thyroid  CHEST/LUNG: Clear to auscultation bilaterally; No rales, rhonchi, wheezing, or rubs  HEART: Regular rate and rhythm; No murmurs, rubs, or gallops  ABDOMEN: Soft, Nontender, Nondistended; Bowel sounds present  EXTREMITIES:  2+ Peripheral Pulses, No clubbing, cyanosis, or edema  LYMPH: No lymphadenopathy noted  SKIN: No rashes or lesions  NERVOUS SYSTEM:  Alert & Oriented X4, Good concentration    LABS:      137  |  103  |  16  ----------------------------<  86  4.5   |  24  |  0.82      135  |  101  |  20  ----------------------------<  117<H>  4.3   |  24  |  0.96      137  |  104  |  16  ----------------------------<  95  4.8   |  24  |  0.78    Ca    8.5      01 Dec 2020 07:07  Ca    8.6      2020 23:35  Ca    8.4      2020 12:52  Phos  3.5         TPro  4.8<L>  /  Alb  2.3<L>  /  TBili  0.5  /  DBili  x   /  AST  100<H>  /  ALT  97<H>  /  AlkPhos  130<H>    TPro  5.1<L>  /  Alb  2.4<L>  /  TBili  0.8  /  DBili  x   /  AST  115<H>  /  ALT  105<H>  /  AlkPhos  143<H>    TPro  5.2<L>  /  Alb  2.3<L>  /  TBili  0.9  /  DBili  x   /  AST  144<H>  /  ALT  112<H>  /  AlkPhos  144<H>        PT/INR - ( 01 Dec 2020 07:11 )   PT: 10.0 sec;   INR: 0.83 ratio         PTT - ( 01 Dec 2020 07:11 )  PTT:37.6 sec              Urinalysis Basic - ( 2020 20:24 )    Color: Dark Yellow / Appearance: Slightly Turbid / S.018 / pH: x  Gluc: x / Ketone: Negative  / Bili: Small / Urobili: 2 mg/dL   Blood: x / Protein: 300 mg/dL / Nitrite: Negative   Leuk Esterase: Negative / RBC: SEE NOTE /hpf / WBC SEE NOTE /HPF   Sq Epi: x / Non Sq Epi: SEE NOTE / Bacteria: See Note  Urine Cr: 102  Urine Total Protein: 517                           9.8    11.82 )-----------( 40       ( 01 Dec 2020 07:08 )             29.0                         10.8   12.94 )-----------( 36       ( 2020 23:35 )             32.2                         11.7   13.73 )-----------( 35       ( 2020 12:52 )             34.4     CAPILLARY BLOOD GLUCOSE              Dr. Joel Klein PGY1 Dr. Joel BELL:[id number]/NS: [phone]  After 7pm please page [phone] or [phone]    NIMO CORRAL  30y  MRN: [id number]    #Incomplete Note       Subjective:    Patient is a 30y old  Female who presents with a chief complaint of Pre-eclampsia (01 Dec 2020 09:27)  Spoke with  and patient at bedside. No complaints overnight. No acute events. she noted that swelling in her upper arms where the IV infiltrated had gone down significantly and mild improvement in her LE edema. No Ha/n/v/d/dysuria. Her surgical site is minimally painful. She noted she hadn't had a BM yet.     MEDICATIONS  (STANDING):  ferrous    sulfate 325 milliGRAM(s) Oral daily  NIFEdipine XL 90 milliGRAM(s) Oral at bedtime  prenatal multivitamin 1 Tablet(s) Oral daily    MEDICATIONS  (PRN):  HYDROmorphone  Injectable 0.5 milliGRAM(s) IV Push once PRN Breakthrough Pain  lanolin Ointment 1 Application(s) Topical every 6 hours PRN Sore Nipples  magnesium hydroxide Suspension 30 milliLiter(s) Oral two times a day PRN Constipation  naloxone Injectable 0.1 milliGRAM(s) IV Push every 3 minutes PRN For ANY of the following changes in patient status:  A. Breaths Per Minute LESS THAN 10, B. Oxygen saturation LESS THAN 90%, C. Sedation score of 6 for Stop After: 4 Times  oxyCODONE    IR 5 milliGRAM(s) Oral every 4 hours PRN Severe Pain (7 - 10)  simethicone 80 milliGRAM(s) Chew every 4 hours PRN Gas        Objective:    Vitals: Vital Signs Last 24 Hrs  T(C): 36.8 (20 @ 09:58), Max: 37.2 (20 @ 12:34)  T(F): 98.3 (20 @ 09:58), Max: 99 (20 @ 12:34)  HR: 100 (20 @ 09:58) (89 - 100)  BP: 136/79 (20 @ 09:58) (132/86 - 138/90)  BP(mean): 10 (20 @ 21:02) (10 - 10)  RR: 18 (20 @ 09:58) (18 - 18)  SpO2: 98% (20 @ 09:58) (95% - 98%)            I&O's Summary    :  -  01 Dec 2020 07:00  --------------------------------------------------------  IN: 0 mL / OUT: 1700 mL / NET: -1700 mL        PHYSICAL EXAM:  GENERAL: NAD, well-groomed, well-developed, obese   HEAD:  Atraumatic, Normocephalic  EYES: EOMI, PERRLA, conjunctiva and sclera clear  ENMT: No tonsillar erythema, exudates, or enlargement; Moist mucous membranes, Good dentition, No lesions  CHEST/LUNG: Clear to auscultation bilaterally; No rales, rhonchi, wheezing, or rubs  HEART: Regular rate and rhythm; No murmurs, rubs, or gallops  ABDOMEN: Soft, Nontender, Nondistended; Bowel sounds present. Abd band present, surgical site clean, dry, and intact.   EXTREMITIES:  2+ Peripheral Pulses. LE pitting Edema 2+ from feet to thigh    SKIN: Mild ecchymosis B/L UE   NERVOUS SYSTEM:  Alert & Oriented X4, Good concentration    LABS:      137  |  103  |  16  ----------------------------<  86  4.5   |  24  |  0.82      135  |  101  |  20  ----------------------------<  117<H>  4.3   |  24  |  0.96      137  |  104  |  16  ----------------------------<  95  4.8   |  24  |  0.78    Ca    8.5      01 Dec 2020 07:07  Ca    8.6      2020 23:35  Ca    8.4      2020 12:52  Phos  3.5         TPro  4.8<L>  /  Alb  2.3<L>  /  TBili  0.5  /  DBili  x   /  AST  100<H>  /  ALT  97<H>  /  AlkPhos  130<H>    TPro  5.1<L>  /  Alb  2.4<L>  /  TBili  0.8  /  DBili  x   /  AST  115<H>  /  ALT  105<H>  /  AlkPhos  143<H>    TPro  5.2<L>  /  Alb  2.3<L>  /  TBili  0.9  /  DBili  x   /  AST  144<H>  /  ALT  112<H>  /  AlkPhos  144<H>        PT/INR - ( 01 Dec 2020 07:11 )   PT: 10.0 sec;   INR: 0.83 ratio         PTT - ( 01 Dec 2020 07:11 )  PTT:37.6 sec              Urinalysis Basic - ( 2020 20:24 )    Color: Dark Yellow / Appearance: Slightly Turbid / S.018 / pH: x  Gluc: x / Ketone: Negative  / Bili: Small / Urobili: 2 mg/dL   Blood: x / Protein: 300 mg/dL / Nitrite: Negative   Leuk Esterase: Negative / RBC: SEE NOTE /hpf / WBC SEE NOTE /HPF   Sq Epi: x / Non Sq Epi: SEE NOTE / Bacteria: See Note  Urine Cr: 102  Urine Total Protein: 517                           9.8    11.82 )-----------( 40       ( 01 Dec 2020 07:08 )             29.0                         10.8   12.94 )-----------( 36       ( 2020 23:35 )             32.2                         11.7   13.73 )-----------( 35       ( 2020 12:52 )             34.4     CAPILLARY BLOOD GLUCOSE              Dr. Joel Klein PGY1 Dr. Joel BELL:[id number]/NS: [phone]  After 7pm please page [phone] or [phone]    NIMO CORRAL  30y  MRN: [id number]      Subjective:    Patient is a 30y old  Female who presents with a chief complaint of Pre-eclampsia (01 Dec 2020 09:27)  Spoke with  and patient at bedside. No complaints overnight. No acute events. she noted that swelling in her upper arms where the IV infiltrated had gone down significantly and mild improvement in her LE edema. No Ha/n/v/d/dysuria. Her surgical site is minimally painful. She noted she hadn't had a BM yet.     MEDICATIONS  (STANDING):  ferrous    sulfate 325 milliGRAM(s) Oral daily  NIFEdipine XL 90 milliGRAM(s) Oral at bedtime  prenatal multivitamin 1 Tablet(s) Oral daily    MEDICATIONS  (PRN):  HYDROmorphone  Injectable 0.5 milliGRAM(s) IV Push once PRN Breakthrough Pain  lanolin Ointment 1 Application(s) Topical every 6 hours PRN Sore Nipples  magnesium hydroxide Suspension 30 milliLiter(s) Oral two times a day PRN Constipation  naloxone Injectable 0.1 milliGRAM(s) IV Push every 3 minutes PRN For ANY of the following changes in patient status:  A. Breaths Per Minute LESS THAN 10, B. Oxygen saturation LESS THAN 90%, C. Sedation score of 6 for Stop After: 4 Times  oxyCODONE    IR 5 milliGRAM(s) Oral every 4 hours PRN Severe Pain (7 - 10)  simethicone 80 milliGRAM(s) Chew every 4 hours PRN Gas        Objective:    Vitals: Vital Signs Last 24 Hrs  T(C): 36.8 (20 @ 09:58), Max: 37.2 (20 @ 12:34)  T(F): 98.3 (20 @ 09:58), Max: 99 (20 @ 12:34)  HR: 100 (20 @ 09:58) (89 - 100)  BP: 136/79 (20 @ 09:58) (132/86 - 138/90)  BP(mean): 10 (20 @ 21:02) (10 - 10)  RR: 18 (20 @ 09:58) (18 - 18)  SpO2: 98% (20 @ 09:58) (95% - 98%)            I&O's Summary    :  -  01 Dec 2020 07:00  --------------------------------------------------------  IN: 0 mL / OUT: 1700 mL / NET: -1700 mL        PHYSICAL EXAM:  GENERAL: NAD, well-groomed, well-developed, obese   HEAD:  Atraumatic, Normocephalic  EYES: EOMI, PERRLA, conjunctiva and sclera clear  ENMT: No tonsillar erythema, exudates, or enlargement; Moist mucous membranes, Good dentition, No lesions  CHEST/LUNG: Clear to auscultation bilaterally; No rales, rhonchi, wheezing, or rubs  HEART: Regular rate and rhythm; No murmurs, rubs, or gallops  ABDOMEN: Soft, Nontender, Nondistended; Bowel sounds present. Abd band present, surgical site clean, dry, and intact.   EXTREMITIES:  2+ Peripheral Pulses. LE pitting Edema 2+ from feet to thigh    SKIN: Mild ecchymosis B/L UE   NERVOUS SYSTEM:  Alert & Oriented X4, Good concentration    LABS:      137  |  103  |  16  ----------------------------<  86  4.5   |  24  |  0.82      135  |  101  |  20  ----------------------------<  117<H>  4.3   |  24  |  0.96      137  |  104  |  16  ----------------------------<  95  4.8   |  24  |  0.78    Ca    8.5      01 Dec 2020 07:07  Ca    8.6      2020 23:35  Ca    8.4      2020 12:52  Phos  3.5         TPro  4.8<L>  /  Alb  2.3<L>  /  TBili  0.5  /  DBili  x   /  AST  100<H>  /  ALT  97<H>  /  AlkPhos  130<H>    TPro  5.1<L>  /  Alb  2.4<L>  /  TBili  0.8  /  DBili  x   /  AST  115<H>  /  ALT  105<H>  /  AlkPhos  143<H>    TPro  5.2<L>  /  Alb  2.3<L>  /  TBili  0.9  /  DBili  x   /  AST  144<H>  /  ALT  112<H>  /  AlkPhos  144<H>        PT/INR - ( 01 Dec 2020 07:11 )   PT: 10.0 sec;   INR: 0.83 ratio         PTT - ( 01 Dec 2020 07:11 )  PTT:37.6 sec              Urinalysis Basic - ( 2020 20:24 )    Color: Dark Yellow / Appearance: Slightly Turbid / S.018 / pH: x  Gluc: x / Ketone: Negative  / Bili: Small / Urobili: 2 mg/dL   Blood: x / Protein: 300 mg/dL / Nitrite: Negative   Leuk Esterase: Negative / RBC: SEE NOTE /hpf / WBC SEE NOTE /HPF   Sq Epi: x / Non Sq Epi: SEE NOTE / Bacteria: See Note  Urine Cr: 102  Urine Total Protein: 517                           9.8    11.82 )-----------( 40       ( 01 Dec 2020 07:08 )             29.0                         10.8   12.94 )-----------( 36       ( 2020 23:35 )             32.2                         11.7   13.73 )-----------( 35       ( 2020 12:52 )             34.4     CAPILLARY BLOOD GLUCOSE              Dr. Joel Klein PGY1

## 2020-12-01 NOTE — PROGRESS NOTE ADULT - PROBLEM SELECTOR PLAN 1
Patient with proteinuria likely in the setting of pre-eclampsia/HELLP however suspicion for nephrotic syndrome     - Given self improvement of P/Cr from 2000/128 to 517/102, proteinuria likely 2/2 HELLP. Can continue to monitor outpatient but if sx don't improve in a few weeks, then can pursue outpatient IR guided biopsy.   - F/u PEDRO PABLO, dsDNA, Anti-Sm, APLS, EBV, Parvo, HIV, Hepatitis B/C, Complements c3 and c4, PLA2R, SPEP/Iimmunofixation/free light chains, RPR, [id number].  - F/u Renal US with Duplex of renal artery and vein to rule/out RVT.  - Lipid Panel: hypercholesterolemia: would recommend Lipitor 80mg but weigh risks/benefits if patient wishes to breastfeed as statins can pass to baby    - Would recommend Bumex 1mg bid but if pt wishes to breastfeed, can hold as it can decrease breast milk production. Patient with proteinuria likely in the setting of pre-eclampsia/HELLP however suspicion for nephrotic syndrome     - Given self improvement of P/Cr from 2000/128 to 517/102, proteinuria likely 2/2 pre-eclampsia/ HELLP. Can continue to monitor outpatient but if sx don't improve in a few weeks, then can pursue outpatient IR guided biopsy.   - F/u PEDRO PABLO, dsDNA, Anti-Sm, APLS, EBV, Parvo, HIV, Hepatitis B/C, Complements c3 and c4, PLA2R, SPEP/Iimmunofixation/free light chains, RPR, [id number].  - F/u Renal US with Duplex of renal artery and vein to rule/out RVT.  - Lipid Panel: hypercholesterolemia: would recommend Lipitor 80mg but weigh risks/benefits if patient wishes to breastfeed as statins can pass to baby    - Would recommend Bumex 1mg bid but if pt wishes to breastfeed, can hold as it can decrease breast milk production.

## 2020-12-01 NOTE — PROGRESS NOTE ADULT - SUBJECTIVE AND OBJECTIVE BOX
OB Postpartum Note:  Delivery, POD#4    Pt seen and examined at bedside. The patient feels well.  Pain is well controlled. She is tolerating a regular diet and passing flatus. She is voiding spontaneously and ambulating without difficulty. Denies CP/SOB. Denies lightheadedness/dizziness. Denies N/V. Denies headaches, vision changes, RUQ pain.       O:  Vitals:  Vital Signs Last 24 Hrs  T(C): 36.9 (01 Dec 2020 05:00), Max: 37.2 (2020 12:34)  T(F): 98.4 (01 Dec 2020 05:00), Max: 99 (2020 12:34)  HR: 96 (01 Dec 2020 05:00) (89 - 96)  BP: 132/86 (01 Dec 2020 05:00) (119/82 - 138/90)  BP(mean): 10 (2020 21:02) (10 - 10)  RR: 18 (01 Dec 2020 05:00) (18 - 18)  SpO2: 96% (01 Dec 2020 05:00) (95% - 98%)    MEDICATIONS  (STANDING):  ferrous    sulfate 325 milliGRAM(s) Oral daily  NIFEdipine XL 90 milliGRAM(s) Oral at bedtime  prenatal multivitamin 1 Tablet(s) Oral daily    MEDICATIONS  (PRN):  HYDROmorphone  Injectable 0.5 milliGRAM(s) IV Push once PRN Breakthrough Pain  lanolin Ointment 1 Application(s) Topical every 6 hours PRN Sore Nipples  magnesium hydroxide Suspension 30 milliLiter(s) Oral two times a day PRN Constipation  naloxone Injectable 0.1 milliGRAM(s) IV Push every 3 minutes PRN For ANY of the following changes in patient status:  A. Breaths Per Minute LESS THAN 10, B. Oxygen saturation LESS THAN 90%, C. Sedation score of 6 for Stop After: 4 Times  oxyCODONE    IR 5 milliGRAM(s) Oral every 4 hours PRN Severe Pain (7 - 10)  simethicone 80 milliGRAM(s) Chew every 4 hours PRN Gas      LABS:  Blood type: O Positive  Rubella IgG: RPR: Negative                          9.8<L>   11.82<H> >-----------< 40<L>    (  @ 07:08 )             29.0<L>                        10.8<L>   12.94<H> >-----------< 36<L>    (  23:35 )             32.2<L>                        11.7   13.73<H> >-----------< 35<L>    (  12:52 )             34.4<L>                        11.2<L>   12.47<H> >-----------< 33<L>    (  05:44 )             33.5<L>                        12.3   12.84<H> >-----------< 37<L>    (  01:37 )             35.9                        12.9   14.22<H> >-----------< 50<L>    (  18:36 )             37.2                        13.2   12.97<H> >-----------< 80<L>    (  07:17 )             38.6                        11.8   13.53<H> >-----------< 88<L>    (  16:11 )             34.8                        12.8   14.15<H> >-----------< 98<L>    (  12:28 )             36.5                        13.1   12.00<H> >-----------< 93<L>    (  08:16 )             37.4    20 23:35      135  |  101  |  20  ----------------------------<  117<H>  4.3   |  24  |  0.96    20 12:52      137  |  104  |  16  ----------------------------<  95  4.8   |  24  |  0.78    20 05:44      137  |  104  |  17  ----------------------------<  80  4.6   |  25  |  0.86    20 @ 01:37      137  |  103  |  19  ----------------------------<  88  4.8   |  25  |  1.00    20 @ 18:36      134<L>  |  100  |  19  ----------------------------<  92  4.6   |  23  |  0.92    20 @ 07:17      136  |  102  |  20  ----------------------------<  84  4.6   |  23  |  0.86    20 @ 16:11      131<L>  |  102  |  20  ----------------------------<  134<H>  5.0   |  19<L>  |  0.88    20 @ 12:28      133<L>  |  101  |  20  ----------------------------<  140<H>  5.0   |  18<L>  |  0.87    20 @ 08:16      131<L>  |  101  |  18  ----------------------------<  121<H>  4.9   |  17<L>  |  0.84        Ca    8.6      2020 23:35  Ca    8.4      2020 12:52  Ca    8.1<L>      2020 05:44  Ca    8.1<L>      2020 01:37  Ca    7.9<L>      2020 18:36  Ca    7.8<L>      2020 07:17  Ca    7.6<L>      2020 16:11  Ca    7.9<L>      2020 12:28  Ca    8.1<L>      2020 08:16  Phos  3.5       Phos  2.9       Phos  3.6       Phos  4.9<H>       Phos  4.8<H>       Phos  4.8<H>       Mg     6.5<H>       Mg     6.7<H>       Mg     6.6<H>         TPro  5.1<L>  /  Alb  2.4<L>  /  TBili  0.8  /  DBili  x   /  AST  115<H>  /  ALT  105<H>  /  AlkPhos  143<H>  20 @ 23:35  TPro  5.2<L>  /  Alb  2.3<L>  /  TBili  0.9  /  DBili  x   /  AST  144<H>  /  ALT  112<H>  /  AlkPhos  144<H>  20 @ 12:52  TPro  4.9<L>  /  Alb  2.3<L>  /  TBili  1.1  /  DBili  x   /  AST  183<H>  /  ALT  120<H>  /  AlkPhos  126<H>  20 @ 05:44  TPro  5.1<L>  /  Alb  2.4<L>  /  TBili  1.1  /  DBili  x   /  AST  216<H>  /  ALT  138<H>  /  AlkPhos  136<H>  20 @ 01:37  TPro  5.2<L>  /  Alb  2.4<L>  /  TBili  1.2  /  DBili  x   /  AST  177<H>  /  ALT  122<H>  /  AlkPhos  134<H>  20 @ 18:36  TPro  5.3<L>  /  Alb  2.3<L>  /  TBili  0.5  /  DBili  x   /  AST  90<H>  /  ALT  73<H>  /  AlkPhos  120  20 @ 07:17  TPro  4.5<L>  /  Alb  2.1<L>  /  TBili  0.5  /  DBili  x   /  AST  107<H>  /  ALT  77<H>  /  AlkPhos  107  20 @ 16:11  TPro  4.9<L>  /  Alb  2.3<L>  /  TBili  0.5  /  DBili  x   /  AST  131<H>  /  ALT  92<H>  /  AlkPhos  119  20 @ 12:28  TPro  4.8<L>  /  Alb  2.3<L>  /  TBili  0.5  /  DBili  x   /  AST  150<H>  /  ALT  96<H>  /  AlkPhos  116  20 @ 08:16          Physical exam:  Gen: NAD  Abdomen: Soft, nontender, no distension , firm uterine fundus at umbilicus.  Incision: Clean, dry, and intact   Ext: 2+ edema bilaterally, no tenderness to palpation

## 2020-12-01 NOTE — PROGRESS NOTE ADULT - ASSESSMENT
A/P: 29 y/o , POD#4 s/p pLTCS for sPEC at 35wk4d. Patient admitted to SICU for BP control with a cardine infusion due to maxing out on IV antihypertensives within a 24 hour period, now transitioned to the OB floor. Patient's labs concerning for HELLP syndrome as well as nephrotic range proteinuria. Creatinine wnl. She is currently without symx of PEC. Plts low but stable at 36 this morning, AST/ALT and LDH uptrending, . Likely HELLP, but differential includes TTP, HUS, and AFLP.       # HELLP  -plts low but stable, 33->35-36  -Continue Q12hr HELLP labs  - c/w procardia 90  - s/p cardine drip  - Consider restarting HSQ today if plts uptrend  - maintain active T+S  - TTE pending  - Appreciate CardiOB input  -Appreciate hematology recs, no need for plt transfusion at this time    # nephrotic range proteinuria  -Nephrology following, nephrotic syndrome labs sent and pending. Renal US this AM. Recommending renal biopsy.  -IR consulted yesterday, recommend completion of noninvasive testing prior to invasive renal biopsy. If biopsy is warrented, plt > 50 needed prior to invasive testing.  - HSQ held for thrombocytopenia, c/w SCDs       #Postpartum  - COVID neg ()  - Reg diet  - Oxycodone for pain management     misti [email or web address]

## 2020-12-01 NOTE — PROGRESS NOTE ADULT - ASSESSMENT
30F w/ no pertinent past medical history initially presented with acute hypertension concerning for severe pre-eclampsia but found to have evidence of hemolysis, transaminitis, and thrombocytopenia suggestive of HELLP syndrome. Now POD#5 s/p LTCS. Hematology consulted for worsening thrombocytopenia.    Recommendations:  - Most likely HELLP syndrome, but platelets had been downtrending through POD#4. DDx also includes TTP and HUS  - dRVVT likely false positive for lupus anticoagulant given subQ heparin use. SCT negative for LA. SQH discontinued 11/30  - Plt now uptrending, currently at 40 K/uL. LDH, bilirubin, AST, and ALT are downtrending.   - No overt bleeding. Worsening normocytic anemia due to hemolysis.    - Monitor CBC, AST/ALT and hemolysis labs (LDH, haptoglobin, bilirubin, retic count) daily  - f/u ADAMTS 13 assay and APLS labs  - If requiring renal biopsy, transfuse platelets to goal >= 50. Low suspicion for TTP.  - Will continue to follow    Julien Mckeon MD  Internal Medicine PGY-2  [phone] / [phone] 30F w/ no pertinent past medical history initially presented with acute hypertension concerning for severe pre-eclampsia but found to have evidence of hemolysis, transaminitis, and thrombocytopenia suggestive of HELLP syndrome. Now POD#5 s/p LTCS. Hematology consulted for worsening thrombocytopenia.    Recommendations:  - Most likely HELLP syndrome, but platelets had been downtrending through POD#4. DDx also includes TTP and HUS  - dRVVT likely false positive for lupus anticoagulant given subQ heparin use. SCT negative for LA. SQH discontinued 11/30  - Plt now uptrending, currently at 40 K/uL. LDH, bilirubin, AST, and ALT are downtrending.   - No overt bleeding. Worsening normocytic anemia due to hemolysis.    - Monitor CBC, AST/ALT and hemolysis labs (LDH, haptoglobin, bilirubin, retic count) daily  - f/u ADAMTS 13 assay and APLS labs  - If requiring renal biopsy, transfuse platelets to goal >= 50. Low suspicion for TTP.  - Given that pt is ambulatory, she is already above the goal of Plt 30 K/uL. If pt is not having any overt bleeding and does not require renal biopsy, no hematologic contraindications to discharge.    Julien Mckeon MD  Internal Medicine PGY-2  [phone] / [phone]

## 2020-12-01 NOTE — PROGRESS NOTE ADULT - PROBLEM SELECTOR PLAN 2
- On nifedipine 90 QD; given significant edema, there is question if known edema side effect profile of nifedipine is confounding question of nephrotic syndrome.   - can d/c and switch to losartan 100mg if pt does not want to breastfeed; if she does wish to breastfeed, can continue w/ current regimen Bp: 130s/90s  - On nifedipine 90 QD; given significant edema, there is question if known edema side effect profile of nifedipine is confounding question of nephrotic syndrome.   - can d/c and switch to losartan 100mg if pt does not want to breastfeed; if she does wish to breastfeed, can continue w/ current regimen

## 2020-12-01 NOTE — PROGRESS NOTE ADULT - SUBJECTIVE AND OBJECTIVE BOX
Hematology Follow-up    INTERVAL HPI/OVERNIGHT EVENTS:  Patient at ultrasound this AM; will re-evaluate. Explained to pt's  at bedside that platelet counts and LFTs are improving.    VITAL SIGNS:  T(F): 98.4 (20 @ 05:00)  HR: 96 (20 @ 05:00)  BP: 132/86 (20 @ 05:00)  RR: 18 (20 @ 05:00)  SpO2: 96% (20 @ 05:00)  Wt(kg): --    PHYSICAL EXAM:      MEDICATIONS  (STANDING):  ferrous    sulfate 325 milliGRAM(s) Oral daily  NIFEdipine XL 90 milliGRAM(s) Oral at bedtime  prenatal multivitamin 1 Tablet(s) Oral daily    MEDICATIONS  (PRN):  HYDROmorphone  Injectable 0.5 milliGRAM(s) IV Push once PRN Breakthrough Pain  lanolin Ointment 1 Application(s) Topical every 6 hours PRN Sore Nipples  magnesium hydroxide Suspension 30 milliLiter(s) Oral two times a day PRN Constipation  naloxone Injectable 0.1 milliGRAM(s) IV Push every 3 minutes PRN For ANY of the following changes in patient status:  A. Breaths Per Minute LESS THAN 10, B. Oxygen saturation LESS THAN 90%, C. Sedation score of 6 for Stop After: 4 Times  oxyCODONE    IR 5 milliGRAM(s) Oral every 4 hours PRN Severe Pain (7 - 10)  simethicone 80 milliGRAM(s) Chew every 4 hours PRN Gas      No Known Allergies      LABS:                        9.8    11.82 )-----------( 40       ( 01 Dec 2020 07:08 )             29.0     12    137  |  103  |  16  ----------------------------<  86  4.5   |  24  |  0.82    Ca    8.5      01 Dec 2020 07:07  Phos  3.5     11-30    TPro  4.8<L>  /  Alb  2.3<L>  /  TBili  0.5  /  DBili  x   /  AST  100<H>  /  ALT  97<H>  /  AlkPhos  130<H>  12    PT/INR - ( 01 Dec 2020 07:11 )   PT: 10.0 sec;   INR: 0.83 ratio         PTT - ( 01 Dec 2020 07:11 )  PTT:37.6 sec Fibrinogen Assay: 1066 mg/dL ( @ 07:11)  Lactate Dehydrogenase, Serum: 438 U/L ( @ 07:07)  Lactate Dehydrogenase, Serum: 542 U/L ( @ 23:35)  Fibrinogen Assay: 1114 mg/dL ( @ 23:35)  Lactate Dehydrogenase, Serum: 645 U/L ( @ 12:52)  Haptoglobin, Serum: <20 mg/dL ( @ 10:45)    Urinalysis Basic - ( 2020 20:24 )    Color: Dark Yellow / Appearance: Slightly Turbid / S.018 / pH: x  Gluc: x / Ketone: Negative  / Bili: Small / Urobili: 2 mg/dL   Blood: x / Protein: 300 mg/dL / Nitrite: Negative   Leuk Esterase: Negative / RBC: SEE NOTE /hpf / WBC SEE NOTE /HPF   Sq Epi: x / Non Sq Epi: SEE NOTE / Bacteria: See Note        RADIOLOGY & ADDITIONAL TESTS:  Studies reviewed.   Hematology Follow-up    INTERVAL HPI/OVERNIGHT EVENTS:  Patient at ultrasound this AM; will re-evaluate. Explained to pt's  at bedside that platelet counts and LFTs are improving.    Pt returned from ultrasound. She now feels constipated due to taking oxycodone for post-operative pain. No further episodes of epistaxis and no hematuria.     VITAL SIGNS:  T(F): 98.4 (20 @ 05:00)  HR: 96 (20 @ 05:00)  BP: 132/86 (20 @ 05:00)  RR: 18 (20 @ 05:00)  SpO2: 96% (20 @ 05:00)  Wt(kg): --    PHYSICAL EXAM:  GENERAL: NAD, well-developed  HEAD:  Atraumatic, Normocephalic  CHEST/LUNG: Clear to auscultation bilaterally; No wheeze  HEART: Regular rate and rhythm; No murmurs, rubs, or gallops  ABDOMEN: Soft, non-tender. incision from LTCS intact  EXTREMITIES: Non-pitting edema of b/l UE. 2+ pitting edema b/l LE. 2+ Peripheral Pulses, No clubbing or cyanosis.  NEUROLOGY: non-focal  SKIN: Multiple ecchymoses on b/l UE at sites of prior phlebotomy    MEDICATIONS  (STANDING):  ferrous    sulfate 325 milliGRAM(s) Oral daily  NIFEdipine XL 90 milliGRAM(s) Oral at bedtime  prenatal multivitamin 1 Tablet(s) Oral daily    MEDICATIONS  (PRN):  HYDROmorphone  Injectable 0.5 milliGRAM(s) IV Push once PRN Breakthrough Pain  lanolin Ointment 1 Application(s) Topical every 6 hours PRN Sore Nipples  magnesium hydroxide Suspension 30 milliLiter(s) Oral two times a day PRN Constipation  naloxone Injectable 0.1 milliGRAM(s) IV Push every 3 minutes PRN For ANY of the following changes in patient status:  A. Breaths Per Minute LESS THAN 10, B. Oxygen saturation LESS THAN 90%, C. Sedation score of 6 for Stop After: 4 Times  oxyCODONE    IR 5 milliGRAM(s) Oral every 4 hours PRN Severe Pain (7 - 10)  simethicone 80 milliGRAM(s) Chew every 4 hours PRN Gas      No Known Allergies      LABS:                        9.8    11.82 )-----------( 40       ( 01 Dec 2020 07:08 )             29.0         137  |  103  |  16  ----------------------------<  86  4.5   |  24  |  0.82    Ca    8.5      01 Dec 2020 07:07  Phos  3.5         TPro  4.8<L>  /  Alb  2.3<L>  /  TBili  0.5  /  DBili  x   /  AST  100<H>  /  ALT  97<H>  /  AlkPhos  130<H>      PT/INR - ( 01 Dec 2020 07:11 )   PT: 10.0 sec;   INR: 0.83 ratio         PTT - ( 01 Dec 2020 07:11 )  PTT:37.6 sec Fibrinogen Assay: 1066 mg/dL ( @ 07:11)  Lactate Dehydrogenase, Serum: 438 U/L ( @ 07:07)  Lactate Dehydrogenase, Serum: 542 U/L ( @ 23:35)  Fibrinogen Assay: 1114 mg/dL ( @ 23:35)  Lactate Dehydrogenase, Serum: 645 U/L ( @ 12:52)  Haptoglobin, Serum: <20 mg/dL ( @ 10:45)    Urinalysis Basic - ( 2020 20:24 )    Color: Dark Yellow / Appearance: Slightly Turbid / S.018 / pH: x  Gluc: x / Ketone: Negative  / Bili: Small / Urobili: 2 mg/dL   Blood: x / Protein: 300 mg/dL / Nitrite: Negative   Leuk Esterase: Negative / RBC: SEE NOTE /hpf / WBC SEE NOTE /HPF   Sq Epi: x / Non Sq Epi: SEE NOTE / Bacteria: See Note        RADIOLOGY & ADDITIONAL TESTS:  Studies reviewed.

## 2020-12-01 NOTE — PROGRESS NOTE ADULT - ASSESSMENT
30 year old  female with no pertinent past medical history who presented to the hospital with elevated BP, low platelets and elevated liver enzymes requiring  delivery at 35w3d found to have continued hypertension and proteinuria concerning for nephrotic syndrome self resolving

## 2020-12-02 ENCOUNTER — TRANSCRIPTION ENCOUNTER (OUTPATIENT)
Age: 30
End: 2020-12-02

## 2020-12-02 VITALS
HEART RATE: 90 BPM | DIASTOLIC BLOOD PRESSURE: 86 MMHG | OXYGEN SATURATION: 98 % | RESPIRATION RATE: 20 BRPM | TEMPERATURE: 98 F | SYSTOLIC BLOOD PRESSURE: 138 MMHG

## 2020-12-02 LAB
ADAMTS13 ACTIVITY: 49.9 % — LOW
ADAMTS13-COMMENT: SIGNIFICANT CHANGE UP
ALBUMIN SERPL ELPH-MCNC: 2.3 G/DL — LOW (ref 3.3–5)
ALP SERPL-CCNC: 139 U/L — HIGH (ref 40–120)
ALT FLD-CCNC: 67 U/L — HIGH (ref 10–45)
ANION GAP SERPL CALC-SCNC: 8 MMOL/L — SIGNIFICANT CHANGE UP (ref 5–17)
APTT BLD: 33.1 SEC — SIGNIFICANT CHANGE UP (ref 27.5–35.5)
AST SERPL-CCNC: 60 U/L — HIGH (ref 10–40)
BASOPHILS # BLD AUTO: 0.02 K/UL — SIGNIFICANT CHANGE UP (ref 0–0.2)
BASOPHILS NFR BLD AUTO: 0.2 % — SIGNIFICANT CHANGE UP (ref 0–2)
BILIRUB SERPL-MCNC: 0.3 MG/DL — SIGNIFICANT CHANGE UP (ref 0.2–1.2)
BUN SERPL-MCNC: 16 MG/DL — SIGNIFICANT CHANGE UP (ref 7–23)
CALCIUM SERPL-MCNC: 8.1 MG/DL — LOW (ref 8.4–10.5)
CHLORIDE SERPL-SCNC: 106 MMOL/L — SIGNIFICANT CHANGE UP (ref 96–108)
CO2 SERPL-SCNC: 26 MMOL/L — SIGNIFICANT CHANGE UP (ref 22–31)
CREAT SERPL-MCNC: 0.77 MG/DL — SIGNIFICANT CHANGE UP (ref 0.5–1.3)
EOSINOPHIL # BLD AUTO: 0.1 K/UL — SIGNIFICANT CHANGE UP (ref 0–0.5)
EOSINOPHIL NFR BLD AUTO: 1.1 % — SIGNIFICANT CHANGE UP (ref 0–6)
FIBRINOGEN PPP-MCNC: 1044 MG/DL — HIGH (ref 290–520)
GLUCOSE SERPL-MCNC: 87 MG/DL — SIGNIFICANT CHANGE UP (ref 70–99)
HCT VFR BLD CALC: 26 % — LOW (ref 34.5–45)
HGB BLD-MCNC: 8.6 G/DL — LOW (ref 11.5–15.5)
IMM GRANULOCYTES NFR BLD AUTO: 0.9 % — SIGNIFICANT CHANGE UP (ref 0–1.5)
INR BLD: 0.87 RATIO — LOW (ref 0.88–1.16)
LDH SERPL L TO P-CCNC: 296 U/L — HIGH (ref 50–242)
LYMPHOCYTES # BLD AUTO: 2.59 K/UL — SIGNIFICANT CHANGE UP (ref 1–3.3)
LYMPHOCYTES # BLD AUTO: 29.2 % — SIGNIFICANT CHANGE UP (ref 13–44)
MCHC RBC-ENTMCNC: 33.1 GM/DL — SIGNIFICANT CHANGE UP (ref 32–36)
MCHC RBC-ENTMCNC: 33.1 PG — SIGNIFICANT CHANGE UP (ref 27–34)
MCV RBC AUTO: 100 FL — SIGNIFICANT CHANGE UP (ref 80–100)
MONOCYTES # BLD AUTO: 0.71 K/UL — SIGNIFICANT CHANGE UP (ref 0–0.9)
MONOCYTES NFR BLD AUTO: 8 % — SIGNIFICANT CHANGE UP (ref 2–14)
NEUTROPHILS # BLD AUTO: 5.37 K/UL — SIGNIFICANT CHANGE UP (ref 1.8–7.4)
NEUTROPHILS NFR BLD AUTO: 60.6 % — SIGNIFICANT CHANGE UP (ref 43–77)
NRBC # BLD: 0 /100 WBCS — SIGNIFICANT CHANGE UP (ref 0–0)
PHOSPHOLIPASE A2 RECEPTOR ELISA: <2 RU/ML — SIGNIFICANT CHANGE UP
PHOSPHOLIPASE A2 RECEPTOR IFA: NEGATIVE — SIGNIFICANT CHANGE UP
PLATELET # BLD AUTO: 81 K/UL — LOW (ref 150–400)
POTASSIUM SERPL-MCNC: 4.7 MMOL/L — SIGNIFICANT CHANGE UP (ref 3.5–5.3)
POTASSIUM SERPL-SCNC: 4.7 MMOL/L — SIGNIFICANT CHANGE UP (ref 3.5–5.3)
PROT SERPL-MCNC: 4.9 G/DL — LOW (ref 6–8.3)
PROTHROM AB SERPL-ACNC: 10.5 SEC — LOW (ref 10.6–13.6)
RBC # BLD: 2.6 M/UL — LOW (ref 3.8–5.2)
RBC # FLD: 15.2 % — HIGH (ref 10.3–14.5)
SODIUM SERPL-SCNC: 140 MMOL/L — SIGNIFICANT CHANGE UP (ref 135–145)
URATE SERPL-MCNC: 7 MG/DL — SIGNIFICANT CHANGE UP (ref 2.5–7)
WBC # BLD: 8.87 K/UL — SIGNIFICANT CHANGE UP (ref 3.8–10.5)
WBC # FLD AUTO: 8.87 K/UL — SIGNIFICANT CHANGE UP (ref 3.8–10.5)

## 2020-12-02 PROCEDURE — 84100 ASSAY OF PHOSPHORUS: CPT

## 2020-12-02 PROCEDURE — 86663 EPSTEIN-BARR ANTIBODY: CPT

## 2020-12-02 PROCEDURE — 99232 SBSQ HOSP IP/OBS MODERATE 35: CPT | Mod: GC

## 2020-12-02 PROCEDURE — 82947 ASSAY GLUCOSE BLOOD QUANT: CPT

## 2020-12-02 PROCEDURE — 84156 ASSAY OF PROTEIN URINE: CPT

## 2020-12-02 PROCEDURE — 93975 VASCULAR STUDY: CPT

## 2020-12-02 PROCEDURE — 84165 PROTEIN E-PHORESIS SERUM: CPT

## 2020-12-02 PROCEDURE — 84550 ASSAY OF BLOOD/URIC ACID: CPT

## 2020-12-02 PROCEDURE — U0003: CPT

## 2020-12-02 PROCEDURE — 86146 BETA-2 GLYCOPROTEIN ANTIBODY: CPT

## 2020-12-02 PROCEDURE — 80061 LIPID PANEL: CPT

## 2020-12-02 PROCEDURE — 83516 IMMUNOASSAY NONANTIBODY: CPT

## 2020-12-02 PROCEDURE — 86803 HEPATITIS C AB TEST: CPT

## 2020-12-02 PROCEDURE — 83605 ASSAY OF LACTIC ACID: CPT

## 2020-12-02 PROCEDURE — 86225 DNA ANTIBODY NATIVE: CPT

## 2020-12-02 PROCEDURE — 81001 URINALYSIS AUTO W/SCOPE: CPT

## 2020-12-02 PROCEDURE — 88307 TISSUE EXAM BY PATHOLOGIST: CPT

## 2020-12-02 PROCEDURE — 85384 FIBRINOGEN ACTIVITY: CPT

## 2020-12-02 PROCEDURE — 85018 HEMOGLOBIN: CPT

## 2020-12-02 PROCEDURE — 85730 THROMBOPLASTIN TIME PARTIAL: CPT

## 2020-12-02 PROCEDURE — 85045 AUTOMATED RETICULOCYTE COUNT: CPT

## 2020-12-02 PROCEDURE — 85397 CLOTTING FUNCT ACTIVITY: CPT

## 2020-12-02 PROCEDURE — 86850 RBC ANTIBODY SCREEN: CPT

## 2020-12-02 PROCEDURE — 87389 HIV-1 AG W/HIV-1&-2 AB AG IA: CPT

## 2020-12-02 PROCEDURE — 86255 FLUORESCENT ANTIBODY SCREEN: CPT

## 2020-12-02 PROCEDURE — 86147 CARDIOLIPIN ANTIBODY EA IG: CPT

## 2020-12-02 PROCEDURE — 85014 HEMATOCRIT: CPT

## 2020-12-02 PROCEDURE — C1765: CPT

## 2020-12-02 PROCEDURE — 83615 LACTATE (LD) (LDH) ENZYME: CPT

## 2020-12-02 PROCEDURE — 83735 ASSAY OF MAGNESIUM: CPT

## 2020-12-02 PROCEDURE — 99231 SBSQ HOSP IP/OBS SF/LOW 25: CPT

## 2020-12-02 PROCEDURE — 82570 ASSAY OF URINE CREATININE: CPT

## 2020-12-02 PROCEDURE — 82330 ASSAY OF CALCIUM: CPT

## 2020-12-02 PROCEDURE — 85027 COMPLETE CBC AUTOMATED: CPT

## 2020-12-02 PROCEDURE — 82803 BLOOD GASES ANY COMBINATION: CPT

## 2020-12-02 PROCEDURE — 86901 BLOOD TYPING SEROLOGIC RH(D): CPT

## 2020-12-02 PROCEDURE — 86664 EPSTEIN-BARR NUCLEAR ANTIGEN: CPT

## 2020-12-02 PROCEDURE — 86160 COMPLEMENT ANTIGEN: CPT

## 2020-12-02 PROCEDURE — 86780 TREPONEMA PALLIDUM: CPT

## 2020-12-02 PROCEDURE — 80053 COMPREHEN METABOLIC PANEL: CPT

## 2020-12-02 PROCEDURE — 82435 ASSAY OF BLOOD CHLORIDE: CPT

## 2020-12-02 PROCEDURE — 59025 FETAL NON-STRESS TEST: CPT

## 2020-12-02 PROCEDURE — 85025 COMPLETE CBC W/AUTO DIFF WBC: CPT

## 2020-12-02 PROCEDURE — 83010 ASSAY OF HAPTOGLOBIN QUANT: CPT

## 2020-12-02 PROCEDURE — 86747 PARVOVIRUS ANTIBODY: CPT

## 2020-12-02 PROCEDURE — 59050 FETAL MONITOR W/REPORT: CPT

## 2020-12-02 PROCEDURE — 86038 ANTINUCLEAR ANTIBODIES: CPT

## 2020-12-02 PROCEDURE — 93306 TTE W/DOPPLER COMPLETE: CPT

## 2020-12-02 PROCEDURE — 84132 ASSAY OF SERUM POTASSIUM: CPT

## 2020-12-02 PROCEDURE — G0463: CPT

## 2020-12-02 PROCEDURE — 84155 ASSAY OF PROTEIN SERUM: CPT

## 2020-12-02 PROCEDURE — 83521 IG LIGHT CHAINS FREE EACH: CPT

## 2020-12-02 PROCEDURE — 85610 PROTHROMBIN TIME: CPT

## 2020-12-02 PROCEDURE — 86665 EPSTEIN-BARR CAPSID VCA: CPT

## 2020-12-02 PROCEDURE — 86923 COMPATIBILITY TEST ELECTRIC: CPT

## 2020-12-02 PROCEDURE — 86769 SARS-COV-2 COVID-19 ANTIBODY: CPT

## 2020-12-02 PROCEDURE — 86334 IMMUNOFIX E-PHORESIS SERUM: CPT

## 2020-12-02 PROCEDURE — 87340 HEPATITIS B SURFACE AG IA: CPT

## 2020-12-02 PROCEDURE — 84295 ASSAY OF SERUM SODIUM: CPT

## 2020-12-02 PROCEDURE — 86900 BLOOD TYPING SEROLOGIC ABO: CPT

## 2020-12-02 RX ORDER — HEPARIN SODIUM 5000 [USP'U]/ML
5000 INJECTION INTRAVENOUS; SUBCUTANEOUS EVERY 8 HOURS
Refills: 0 | Status: DISCONTINUED | OUTPATIENT
Start: 2020-12-02 | End: 2020-12-02

## 2020-12-02 RX ORDER — ENOXAPARIN SODIUM 100 MG/ML
40 INJECTION SUBCUTANEOUS
Qty: 1200 | Refills: 0
Start: 2020-12-02 | End: 2020-12-31

## 2020-12-02 RX ORDER — NIFEDIPINE 30 MG
1 TABLET, EXTENDED RELEASE 24 HR ORAL
Qty: 30 | Refills: 0
Start: 2020-12-02 | End: 2020-12-31

## 2020-12-02 RX ORDER — OXYCODONE HYDROCHLORIDE 5 MG/1
1 TABLET ORAL
Qty: 12 | Refills: 0
Start: 2020-12-02

## 2020-12-02 RX ADMIN — Medication 1 TABLET(S): at 13:05

## 2020-12-02 RX ADMIN — HEPARIN SODIUM 5000 UNIT(S): 5000 INJECTION INTRAVENOUS; SUBCUTANEOUS at 13:06

## 2020-12-02 RX ADMIN — OXYCODONE HYDROCHLORIDE 5 MILLIGRAM(S): 5 TABLET ORAL at 03:37

## 2020-12-02 RX ADMIN — OXYCODONE HYDROCHLORIDE 5 MILLIGRAM(S): 5 TABLET ORAL at 03:07

## 2020-12-02 RX ADMIN — Medication 325 MILLIGRAM(S): at 13:06

## 2020-12-02 NOTE — DISCHARGE NOTE OB - PLAN OF CARE
Normotension Follow up with your OB this week for a blood pressure check.  You were seen by cardio-obstetrics in the hospital. They will contact you for an outpatient appointment  Check you blood pressure daily and keep a log of all values. Bring this to your appointments. If your blood pressure is > 140/90, call your doctor. If you experience headache not relieved with Tylenol, visual changes, or epigastric pain, call you doctor. Well being Please take the Lovenox daily to prevent blood clots.  Follow up with nephrology within 1 week. The phone number is provided above well being After discharge, please stay on pelvic rest for 6 weeks, meaning no sexual intercourse, no tampons and no douching.  No driving for 2 weeks as women can lose a lot of blood during delivery and there is a possibility of being lightheaded/fainting.  No lifting objects heavier than baby for two weeks.  Expect to have vaginal bleeding/spotting for up to six weeks.  The bleeding should get lighter and more white/light brown with time.  For bleeding soaking more than a pad an hour or passing clots greater than the size of your fist, come in to the emergency department.    Follow up in clinic in 2 weeks for incision check.  Call clinic for noticeable increase in redness or swelling at incision, discharge from incision, or opening of skin at incision site.

## 2020-12-02 NOTE — DISCHARGE NOTE OB - CARE PROVIDER_API CALL
Justice Alfonso)  Obstetrics and Gynecology  [address], [address]  [place] [address]  Phone: [phone]  Fax: [phone]  Follow Up Time:     Anai Blanchard  INTERNAL MEDICINE  [address] [place] [address]  Phone: [phone]  Fax: [phone]  Follow Up Time:

## 2020-12-02 NOTE — DISCHARGE NOTE OB - HOSPITAL COURSE
Patient was admitted to L&D with severe range blood pressures and underwent  section. Blood pressures were not controlled on IV antihypertensives, pt was transferred to the SICU on a Cardine drip. Blood pressure control was achieved after 12 hours. Labs were significant for low platelets and elevated LFTs, diagnosis of HELLP syndrome was made. Pt was also found to have nephrotic range proteinuria, nephrology was consulted in house and workup performed, renal sono WNL. Pt to be discharged on daily Lovenox 40 for anticoagulation. Pt will have outpatient follow up with cardioOB. Patient was admitted to L&D with severe range blood pressures and underwent  section. Blood pressures were not controlled on IV antihypertensives, pt was transferred to the SICU on a Cardine drip. Blood pressure control was achieved after 12 hours. Labs were significant for low platelets and elevated LFTs, diagnosis of HELLP syndrome was made. Pt was also found to have nephrotic range proteinuria, nephrology was consulted in house and workup performed, renal sono WNL. Pt to be discharged on daily Lovenox 40 for anticoagulation. Pt will have outpatient follow up with cardioOB. Patient will need repeat APLS labs postpartum

## 2020-12-02 NOTE — DISCHARGE NOTE OB - ENOXAPARIN/LOVENOX EDUCATION
Enoxaparin/Lovenox/ – Dietary Advice/Enoxaparin/Lovenox – Follow up Monitoring/Enoxaparin/Lovenox/ – Compliance/Enoxaparin/Lovenox – Potential for Adverse Drug Reaction and Interactions

## 2020-12-02 NOTE — PROGRESS NOTE ADULT - SUBJECTIVE AND OBJECTIVE BOX
Dr. Joel BELL:[id number]/NS: [phone]  After 7pm please page [phone] or [phone]    NIMO CORRAL  30y  MRN: [id number]    Subjective:    Patient is a 30y old  Female who presents with a chief complaint of Pre-eclampsia (01 Dec 2020 09:27)      MEDICATIONS  (STANDING):  ferrous    sulfate 325 milliGRAM(s) Oral daily  heparin   Injectable 5000 Unit(s) SubCutaneous every 8 hours  NIFEdipine XL 90 milliGRAM(s) Oral at bedtime  prenatal multivitamin 1 Tablet(s) Oral daily    MEDICATIONS  (PRN):  HYDROmorphone  Injectable 0.5 milliGRAM(s) IV Push once PRN Breakthrough Pain  lanolin Ointment 1 Application(s) Topical every 6 hours PRN Sore Nipples  magnesium hydroxide Suspension 30 milliLiter(s) Oral two times a day PRN Constipation  naloxone Injectable 0.1 milliGRAM(s) IV Push every 3 minutes PRN For ANY of the following changes in patient status:  A. Breaths Per Minute LESS THAN 10, B. Oxygen saturation LESS THAN 90%, C. Sedation score of 6 for Stop After: 4 Times  oxyCODONE    IR 5 milliGRAM(s) Oral every 4 hours PRN Severe Pain (7 - 10)  simethicone 80 milliGRAM(s) Chew every 4 hours PRN Gas        Objective:    Vitals: Vital Signs Last 24 Hrs  T(C): 37.1 (20 @ 05:40), Max: 37.1 (20 @ 13:31)  T(F): 98.8 (20 @ 05:40), Max: 98.8 (20 @ 13:31)  HR: 90 (20 @ 05:40) (88 - 112)  BP: 115/78 (20 @ 05:40) (115/78 - 134/80)  BP(mean): --  RR: 18 (20 @ 05:40) (18 - 18)  SpO2: 96% (20 @ 05:40) (96% - 97%)            I&O's Summary      PHYSICAL EXAM:  GENERAL: NAD, well-groomed, well-developed  HEAD:  Atraumatic, Normocephalic  EYES: EOMI, PERRLA, conjunctiva and sclera clear  ENMT: No tonsillar erythema, exudates, or enlargement; Moist mucous membranes, Good dentition, No lesions  NECK: Supple, No JVD, Normal thyroid  CHEST/LUNG: Clear to auscultation bilaterally; No rales, rhonchi, wheezing, or rubs  HEART: Regular rate and rhythm; No murmurs, rubs, or gallops  ABDOMEN: Soft, Nontender, Nondistended; Bowel sounds present  EXTREMITIES:  2+ Peripheral Pulses, No clubbing, cyanosis, or edema  LYMPH: No lymphadenopathy noted  SKIN: No rashes or lesions  NERVOUS SYSTEM:  Alert & Oriented X4, Good concentration    LABS:      140  |  106  |  16  ----------------------------<  87  4.7   |  26  |  0.77      138  |  103  |  15  ----------------------------<  96  4.8   |  26  |  0.84      137  |  103  |  16  ----------------------------<  86  4.5   |  24  |  0.82    Ca    8.1<L>      02 Dec 2020 07:46  Ca    8.1<L>      01 Dec 2020 20:36  Ca    8.5      01 Dec 2020 07:07  Phos  3.5     11-30    TPro  4.9<L>  /  Alb  2.3<L>  /  TBili  0.3  /  DBili  x   /  AST  60<H>  /  ALT  67<H>  /  AlkPhos  139<H>    TPro  4.7<L>  /  Alb  2.3<L>  /  TBili  0.4  /  DBili  x   /  AST  85<H>  /  ALT  85<H>  /  AlkPhos  140<H>    TPro  4.8<L>  /  Alb  2.3<L>  /  TBili  0.5  /  DBili  x   /  AST  100<H>  /  ALT  97<H>  /  AlkPhos  130<H>        PT/INR - ( 02 Dec 2020 07:46 )   PT: 10.5 sec;   INR: 0.87 ratio         PTT - ( 02 Dec 2020 07:46 )  PTT:33.1 sec              Urinalysis Basic - ( 2020 20:24 )    Color: Dark Yellow / Appearance: Slightly Turbid / S.018 / pH: x  Gluc: x / Ketone: Negative  / Bili: Small / Urobili: 2 mg/dL   Blood: x / Protein: 300 mg/dL / Nitrite: Negative   Leuk Esterase: Negative / RBC: SEE NOTE /hpf / WBC SEE NOTE /HPF   Sq Epi: x / Non Sq Epi: SEE NOTE / Bacteria: See Note                              8.6    8.87  )-----------( 81       ( 02 Dec 2020 07:46 )             26.0                         8.9    10.74 )-----------( 71       ( 01 Dec 2020 20:36 )             26.7                         9.8    11.82 )-----------( 40       ( 01 Dec 2020 07:08 )             29.0     CAPILLARY BLOOD GLUCOSE    < from: US Duplex Kidneys (US Doppler Renal) (20 @ 09:23) >    No evidence of a significant renal artery stenosis.    Patent renal veins.    < end of copied text >            Dr. Joel Klein PGY1 Dr. Joel BELL:[id number]/NS: [phone]  After 7pm please page [phone] or [phone]    NIMO CORRAL  30y  MRN: [id number]    Subjective:    Patient is a 30y old  Female who presents with a chief complaint of Pre-eclampsia (01 Dec 2020 09:27)  No complaints overnight. She notes that her LE joints are now showing indicating decreased edema and the tightness of her legs has gone down. She is urinating regularly every few hours w/o hematuria.    MEDICATIONS  (STANDING):  ferrous    sulfate 325 milliGRAM(s) Oral daily  heparin   Injectable 5000 Unit(s) SubCutaneous every 8 hours  NIFEdipine XL 90 milliGRAM(s) Oral at bedtime  prenatal multivitamin 1 Tablet(s) Oral daily    MEDICATIONS  (PRN):  HYDROmorphone  Injectable 0.5 milliGRAM(s) IV Push once PRN Breakthrough Pain  lanolin Ointment 1 Application(s) Topical every 6 hours PRN Sore Nipples  magnesium hydroxide Suspension 30 milliLiter(s) Oral two times a day PRN Constipation  naloxone Injectable 0.1 milliGRAM(s) IV Push every 3 minutes PRN For ANY of the following changes in patient status:  A. Breaths Per Minute LESS THAN 10, B. Oxygen saturation LESS THAN 90%, C. Sedation score of 6 for Stop After: 4 Times  oxyCODONE    IR 5 milliGRAM(s) Oral every 4 hours PRN Severe Pain (7 - 10)  simethicone 80 milliGRAM(s) Chew every 4 hours PRN Gas        Objective:    Vitals: Vital Signs Last 24 Hrs  T(C): 37.1 (20 @ 05:40), Max: 37.1 (20 @ 13:31)  T(F): 98.8 (20 @ 05:40), Max: 98.8 (20 @ 13:31)  HR: 90 (20 @ 05:40) (88 - 112)  BP: 115/78 (20 @ 05:40) (115/78 - 134/80)  BP(mean): --  RR: 18 (20 @ 05:40) (18 - 18)  SpO2: 96% (20 @ 05:40) (96% - 97%)            I&O's Summary      PHYSICAL EXAM:  GENERAL: NAD, well-groomed, well-developed  HEAD:  Atraumatic, Normocephalic  EYES: EOMI, PERRLA, conjunctiva and sclera clear  NECK: Supple, No JVD, Normal thyroid  CHEST/LUNG: Clear to auscultation bilaterally; No rales, rhonchi, wheezing, or rubs  HEART: Regular rate and rhythm; No murmurs, rubs, or gallops  ABDOMEN: Soft, Nontender, Nondistended; Bowel sounds present  EXTREMITIES:  2+ Peripheral Pulses, Edema 2+ B/L LE minorly improved from yesterday   NERVOUS SYSTEM:  Alert & Oriented X4, Good concentration    LABS:      140  |  106  |  16  ----------------------------<  87  4.7   |  26  |  0.77      138  |  103  |  15  ----------------------------<  96  4.8   |  26  |  0.84      137  |  103  |  16  ----------------------------<  86  4.5   |  24  |  0.82    Ca    8.1<L>      02 Dec 2020 07:46  Ca    8.1<L>      01 Dec 2020 20:36  Ca    8.5      01 Dec 2020 07:07  Phos  3.5     11-30    TPro  4.9<L>  /  Alb  2.3<L>  /  TBili  0.3  /  DBili  x   /  AST  60<H>  /  ALT  67<H>  /  AlkPhos  139<H>    TPro  4.7<L>  /  Alb  2.3<L>  /  TBili  0.4  /  DBili  x   /  AST  85<H>  /  ALT  85<H>  /  AlkPhos  140<H>    TPro  4.8<L>  /  Alb  2.3<L>  /  TBili  0.5  /  DBili  x   /  AST  100<H>  /  ALT  97<H>  /  AlkPhos  130<H>        PT/INR - ( 02 Dec 2020 07:46 )   PT: 10.5 sec;   INR: 0.87 ratio         PTT - ( 02 Dec 2020 07:46 )  PTT:33.1 sec              Urinalysis Basic - ( 2020 20:24 )    Color: Dark Yellow / Appearance: Slightly Turbid / S.018 / pH: x  Gluc: x / Ketone: Negative  / Bili: Small / Urobili: 2 mg/dL   Blood: x / Protein: 300 mg/dL / Nitrite: Negative   Leuk Esterase: Negative / RBC: SEE NOTE /hpf / WBC SEE NOTE /HPF   Sq Epi: x / Non Sq Epi: SEE NOTE / Bacteria: See Note                              8.6    8.87  )-----------( 81       ( 02 Dec 2020 07:46 )             26.0                         8.9    10.74 )-----------( 71       ( 01 Dec 2020 20:36 )             26.7                         9.8    11.82 )-----------( 40       ( 01 Dec 2020 07:08 )             29.0     CAPILLARY BLOOD GLUCOSE    < from: US Duplex Kidneys (US Doppler Renal) (20 @ 09:23) >    No evidence of a significant renal artery stenosis.    Patent renal veins.    < end of copied text >            Dr. Joel Klein PGY1

## 2020-12-02 NOTE — PROGRESS NOTE ADULT - SUBJECTIVE AND OBJECTIVE BOX
S/24 Events: Patient seen and examined. Denies CP, SOB, dizziness, LH, near syncope or sycope.   No acute events overnight.   Telemetry : - Not on tele   O:  T(C): 36.8 (12-02-20 @ 09:30), Max: 37.1 (12-01-20 @ 13:31)  HR: 88 (12-02-20 @ 09:30) (88 - 112)  BP: 101/69 (12-02-20 @ 09:30) (101/69 - 134/80)  RR: 18 (12-02-20 @ 09:30) (18 - 18)  SpO2: 97% (12-02-20 @ 09:30) (96% - 97%)    Daily     Daily   Gen: NAD  HEENT: EOMI  CV: RRR, normal S1 + S2, no m/r/g  Lungs: CTAB  Abd: soft, non-tender  Ext: No edema    Labs:                        8.6    8.87  )-----------( 81       ( 02 Dec 2020 07:46 )             26.0     12-02    140  |  106  |  16  ----------------------------<  87  4.7   |  26  |  0.77    Ca    8.1<L>      02 Dec 2020 07:46    TPro  4.9<L>  /  Alb  2.3<L>  /  TBili  0.3  /  DBili  x   /  AST  60<H>  /  ALT  67<H>  /  AlkPhos  139<H>  12-02    PT/INR - ( 02 Dec 2020 07:46 )   PT: 10.5 sec;   INR: 0.87 ratio         PTT - ( 02 Dec 2020 07:46 )  PTT:33.1 sec       DIagnostics : < from: Transthoracic Echocardiogram (12.01.20 @ 07:38) >  Normal left ventricular internal dimensions and wall  thicknesses.  2. Normal left ventricular systolic function. No segmental  wall motion abnormalities.  3. Normal right ventricular size and function.  *** No previous Echo exam.  ------------------------------------------------------------------------  Confirmed on  12/1/2020 - 09:52:30 by GARRET Romero  -------------------------------------------------------------    < end of copied text >      MEDICATIONS  (STANDING):  ferrous    sulfate 325 milliGRAM(s) Oral daily  heparin   Injectable 5000 Unit(s) SubCutaneous every 8 hours  NIFEdipine XL 90 milliGRAM(s) Oral at bedtime  prenatal multivitamin 1 Tablet(s) Oral daily      A/P  30F  @ 35w presented with hypertension 150/90 and lower extremity edema concerning for HEELP syndrome s/p cesarian delivery POD #4.   Echocardiogram showed no wall motion abnormality and normal valvular function.     1. HEELP syndrome: currently hemodynamically stable.   BP stable- 101/69   Continue nifedipine XL 90mg   Advised to monitor BP log at home   Stable for D/c home   Follow up in [facility] OB clinic in 1 week ( [facility] team will contact patient to set up this appointment.   [email or web address]        Thank you.   JALIL Banerjee

## 2020-12-02 NOTE — PROGRESS NOTE ADULT - PROVIDER SPECIALTY LIST ADULT
Cardiology Subsequent Stages Histo Method Verbiage: Using a similar technique to that described above, a thin layer of tissue was removed from all areas where tumor was visible on the previous stage.  The tissue was again oriented, mapped, dyed, and processed as above.

## 2020-12-02 NOTE — CHART NOTE - NSCHARTNOTEFT_GEN_A_CORE
Labs reviewed, PLT and LFT improving, patient can be discharged w/ outpatient OBGYN f/u.    Call us back should you have any questions.        Latasha Lagos MD  PGY 5, Oncology/Hematology fellow  (P) [phone]  After 5pm, please contact on-call team.

## 2020-12-02 NOTE — PROGRESS NOTE ADULT - ATTENDING COMMENTS
Attending:  Pt. gradually improving. BP controlled on Nifedipine. Plt. level 36 this AM, LFTs sl. improving.  Pt. for renal sono this AM    P: Cont. close monitoring    -GK
agree with above note  cont present mgmt  appreciate heme consult  cont to observe labs  ricky grider md
Patient seen and evaluated  Agree with above note  Fantasma was d/c'd this am  Continue present management  For nephrology and cardio/OB consult  MD Jerome
Patient revisited. Appears healthy and relaxed. All her HELLP lab parameters are improving. No hematologic intervention needed. Monitor labs. Supportive care. Heme service is okay with her discharge any time.
Complaint of edema.  Has pneumatic cuff and is walking  1.  Nephrotic syndrome--may be postpartum variant with HELLP component and eventual resolution.  THERAPY including statin, ACEi/SARB, diuretic in patient who desires breast feeding.  Renal bx with platelets <50K significant risk and empiric steroids have significant metabolic effects including DM2 development in this obese individual.  Serologic w/u in progress and outlined outpt f/u.    2.  Hypertension--on edema promoting CCB.  Labetolol may be alternative if edema worsens  3.  Edema--leg elevation, Na restriction  4.  Thrombocytopenia--doubt TTP etiology given preserved renal function and lack of systemic signs.  Trend.  Is hypercoagulable given 1 but anticoagulants CI in this setting
Feeling improved  1.  Nephrotic syndrome--d/c plans discussed with f/u.  NO immuno rx at present.  IF persistent platelet count improving in range when bx will be better tolerated.  May be resolving as becomes remote from delivery  2.  Thrombocytopenia--low oncotic pressure renders her a hypercoagulable risk.  Low dose lovenox seems best strategy.  3,.  Hypertension--on nifedipine.  Labetolol addition as needed
Feels less bloated  1.  Nephrotic syndrome--likely associated with pregnancy rather than underlying primary disease.  NO INDICATION for biopsy at this time.  Continues to diurese well.  Because of breast feeding we will restrict desired statin, RAAS blockers, diuretics.  F/U with Dr. Villanueva as outpt.  2.  Hypertension--adequate control.  NO ACEi, ARB.  Labetolol if addl med required  3.  Thrombocytopenia--likely resolving HELLP and platelets trending up.  HIGH bio[sy risk at this level.

## 2020-12-02 NOTE — PROGRESS NOTE ADULT - PROBLEM SELECTOR PLAN 1
Patient with proteinuria likely in the setting of pre-eclampsia/HELLP    - Given self improvement of P/Cr from 2000/128 to 517/102, proteinuria likely 2/2 pre-eclampsia/ HELLP. No indication for biopsy at this time. Can d/c as per primary team and f/u outpt.   - Outpt f/u PEDRO PABLO, dsDNA, Anti-Sm, APLS, EBV, Parvo, HIV, Hepatitis B/C, Complements c3 and c4, PLA2R, SPEP/Iimmunofixation/free light chains, RPR, [id number].   - Renal US with Duplex: no RVT  - Lipid Panel: hypercholesterolemia: would recommend Lipitor 80mg but weigh risks/benefits if patient wishes to breastfeed as statins can pass to baby    - Would recommend Bumex 1mg bid but if pt wishes to breastfeed, can hold as it can decrease breast milk production. Patient with proteinuria likely in the setting of pre-eclampsia/HELLP    - Given self improvement of P/Cr from 2000/128 to 517/102, proteinuria likely 2/2 pre-eclampsia/ HELLP. No indication for biopsy at this time. Can d/c as per primary team and f/u outpt.   - Outpt f/u PEDRO PABLO, dsDNA, Anti-Sm, APLS, EBV, Parvo, HIV, Hepatitis B/C, Complements c3 and c4, PLA2R, SPEP/Iimmunofixation/free light chains, RPR, [id number].   - Renal US with Duplex: no RVT  - Lipid Panel: hypercholesterolemia: would recommend Lipitor 80mg but weigh risks/benefits if patient wishes to breastfeed as statins can pass to baby    - Would recommend Bumex 1mg bid but if pt wishes to breastfeed, can hold as it can decrease breast milk production.    Patient will need to follow up next week with Dr. Anai Blanchard, at [address] [place] [address], please provide her the # [phone] to schedule an appointment.

## 2020-12-02 NOTE — DISCHARGE NOTE OB - CARE PLAN
Principal Discharge DX:	HELLP syndrome  Goal:	Normotension  Assessment and plan of treatment:	Follow up with your OB this week for a blood pressure check.  You were seen by cardio-obstetrics in the hospital. They will contact you for an outpatient appointment  Check you blood pressure daily and keep a log of all values. Bring this to your appointments. If your blood pressure is > 140/90, call your doctor. If you experience headache not relieved with Tylenol, visual changes, or epigastric pain, call you doctor.  Secondary Diagnosis:	Nephrotic range proteinuria  Goal:	Well being  Assessment and plan of treatment:	Please take the Lovenox daily to prevent blood clots.  Follow up with nephrology within 1 week. The phone number is provided above  Secondary Diagnosis:	 delivery due to maternal disorder, delivered, curr hospitaliz  Goal:	well being  Assessment and plan of treatment:	After discharge, please stay on pelvic rest for 6 weeks, meaning no sexual intercourse, no tampons and no douching.  No driving for 2 weeks as women can lose a lot of blood during delivery and there is a possibility of being lightheaded/fainting.  No lifting objects heavier than baby for two weeks.  Expect to have vaginal bleeding/spotting for up to six weeks.  The bleeding should get lighter and more white/light brown with time.  For bleeding soaking more than a pad an hour or passing clots greater than the size of your fist, come in to the emergency department.    Follow up in clinic in 2 weeks for incision check.  Call clinic for noticeable increase in redness or swelling at incision, discharge from incision, or opening of skin at incision site.

## 2020-12-02 NOTE — DISCHARGE NOTE OB - CARE PROVIDERS DIRECT ADDRESSES
,DirectAddress_Unknown,[email or web address]

## 2020-12-02 NOTE — PROGRESS NOTE ADULT - PROBLEM SELECTOR PLAN 2
Bp: 130s/90s  - On nifedipine 90 QD; given significant edema, there is question if known edema side effect profile of nifedipine is confounding question of nephrotic syndrome.   - can d/c and switch to losartan 100mg if pt does not want to breastfeed; if she does wish to breastfeed, can continue w/ current regimen

## 2020-12-02 NOTE — PROGRESS NOTE ADULT - ASSESSMENT
A/P: 29 y/o , POD#5 s/p pLTCS for sPEC at 35wk4d. Patient admitted to SICU for BP control with a cardine infusion due to maxing out on IV antihypertensives within a 24 hour period, now transitioned to the OB floor. Patient's labs concerning for HELLP syndrome as well as nephrotic range proteinuria. Creatinine wnl. She is currently without symx of PEC. Plts low but stable at 36 this morning, AST/ALT and LDH uptrending, . Likely HELLP, but differential includes TTP, HUS, and AFLP.       # HELLP  -plts low but uptrending, 33->35->36->71  -Continue Q12hr HELLP labs  -c/w procardia 90  -s/p cardine drip  -Consider restarting HSQ today  -maintain active T+S  -TTE grossly normal, EF 72%  -Appreciate CardiOB input  -Appreciate hematology recs, no need for plt transfusion at this time    # nephrotic range proteinuria  -Nephrology following, nephrotic syndrome labs sent and pending.  Recommending renal biopsy.  -Renal US with no vascular or structural abnormalities  -IR consulted yesterday, recommend completion of noninvasive testing prior to invasive renal biopsy. If biopsy is warrented, plt > 50 needed prior to invasive testing.  -HSQ held for thrombocytopenia, c/w SCDs     #Postpartum  - COVID neg ()  - Reg diet  - Oxycodone for pain management     Kristina Wagner PGY3

## 2020-12-02 NOTE — DISCHARGE NOTE OB - SECONDARY DIAGNOSIS.
Nephrotic range proteinuria  [facility]

## 2020-12-02 NOTE — PROGRESS NOTE ADULT - SUBJECTIVE AND OBJECTIVE BOX
NIMO ELLIS    R3 PP Note, POD#5    Interval events: No events overnight    Patient seen and examined at bedside, no acute overnight events. No acute complaints.   Pt denies HA, epigastric pain, blurred vision, CP, SOB, N/V, fevers, and chills.  Patient is ambulating, tolerating a regular diet, voiding spontaneously, and passing flatus.    Vital Signs Last 24 Hours  T(C): 36.2 (12-02-20 @ 01:30), Max: 37.1 (12-01-20 @ 13:31)  HR: 92 (12-02-20 @ 01:30) (88 - 112)  BP: 131/85 (12-02-20 @ 01:30) (116/85 - 136/79)  RR: 18 (12-02-20 @ 01:30) (18 - 18)  SpO2: 97% (12-02-20 @ 01:30) (96% - 98%)    CAPILLARY BLOOD GLUCOSE          Physical Exam:  General: NAD  Abdomen: Soft, non-tender. Fundus firm at umbilicus. Transverse incision clean, dry, intact.   Ext: No pain  Pelvic: lochia wnl      Labs:             8.9    10.74 )-----------( 71       ( 12-01 @ 20:36 )             26.7     12-01 @ 20:36    138  |  103  |  15  ----------------------------<  96  4.8   |  26  |  0.84    Ca    8.1      12-01 @ 20:36  Phos  3.5     11-30 @ 12:52    TPro  4.7  /  Alb  2.3  /  TBili  0.4  /  DBili  x   /  AST  85  /  ALT  85  /  AlkPhos  140  12-01 @ 20:36    PT/INR - ( 12-01 @ 20:36 )   PT: 10.7 sec;   INR: 0.89 ratio    PTT - ( 12-01 @ 20:36 )  PTT:33.0 sec    Uric Acid: (12-01 @ 20:36)  7.4      Fibrinogen: (12-01 @ 20:36)  1068     LDH: (12-01 @ 20:36)  349        MEDICATIONS  (STANDING):  ferrous    sulfate 325 milliGRAM(s) Oral daily  NIFEdipine XL 90 milliGRAM(s) Oral at bedtime  prenatal multivitamin 1 Tablet(s) Oral daily    MEDICATIONS  (PRN):  HYDROmorphone  Injectable 0.5 milliGRAM(s) IV Push once PRN Breakthrough Pain  lanolin Ointment 1 Application(s) Topical every 6 hours PRN Sore Nipples  magnesium hydroxide Suspension 30 milliLiter(s) Oral two times a day PRN Constipation  naloxone Injectable 0.1 milliGRAM(s) IV Push every 3 minutes PRN For ANY of the following changes in patient status:  A. Breaths Per Minute LESS THAN 10, B. Oxygen saturation LESS THAN 90%, C. Sedation score of 6 for Stop After: 4 Times  oxyCODONE    IR 5 milliGRAM(s) Oral every 4 hours PRN Severe Pain (7 - 10)  simethicone 80 milliGRAM(s) Chew every 4 hours PRN Gas

## 2020-12-02 NOTE — DISCHARGE NOTE OB - PATIENT PORTAL LINK FT
You can access the FollowMyHealth Patient Portal offered by [facility] by registering at the following website: [email or web address]. By joining [employer]’s FollowMyHealth portal, you will also be able to view your health information using other applications (apps) compatible with our system.

## 2020-12-02 NOTE — DISCHARGE NOTE OB - MEDICATION SUMMARY - MEDICATIONS TO TAKE
I will START or STAY ON the medications listed below when I get home from the hospital:    oxyCODONE 5 mg oral tablet  -- 1 tab(s) by mouth every 6 hours, As Needed -Severe Pain (7 - 10) MDD:4 tabs  -- Indication: For Pain    Lovenox 40 mg/0.4 mL injectable solution  -- 40 milligram(s) subcutaneously once a day   -- It is very important that you take or use this exactly as directed.  Do not skip doses or discontinue unless directed by your doctor.    -- Indication: For Anticoagulation    NIFEdipine 90 mg oral tablet, extended release  -- 1 tab(s) by mouth once a day (at bedtime)  -- Indication: For Blood pressure

## 2020-12-03 PROBLEM — J45.909 UNSPECIFIED ASTHMA, UNCOMPLICATED: Chronic | Status: ACTIVE | Noted: 2020-11-27

## 2020-12-03 LAB
% ALBUMIN: 47.1 % — SIGNIFICANT CHANGE UP
% ALPHA 1: 10 % — SIGNIFICANT CHANGE UP
% ALPHA 2: 15.8 % — SIGNIFICANT CHANGE UP
% BETA: 16.1 % — SIGNIFICANT CHANGE UP
% GAMMA: 11 % — SIGNIFICANT CHANGE UP
% M SPIKE: SIGNIFICANT CHANGE UP %
ALBUMIN SERPL ELPH-MCNC: 2.3 G/DL — LOW (ref 3.6–5.5)
ALBUMIN/GLOB SERPL ELPH: 0.9 RATIO — SIGNIFICANT CHANGE UP
ALPHA1 GLOB SERPL ELPH-MCNC: 0.5 G/DL — HIGH (ref 0.1–0.4)
ALPHA2 GLOB SERPL ELPH-MCNC: 0.8 G/DL — SIGNIFICANT CHANGE UP (ref 0.5–1)
B-GLOBULIN SERPL ELPH-MCNC: 0.8 G/DL — SIGNIFICANT CHANGE UP (ref 0.5–1)
GAMMA GLOBULIN: 0.5 G/DL — LOW (ref 0.6–1.6)
INTERPRETATION SERPL IFE-IMP: SIGNIFICANT CHANGE UP
M-SPIKE: SIGNIFICANT CHANGE UP G/DL (ref 0–0)
PROT PATTERN SERPL ELPH-IMP: SIGNIFICANT CHANGE UP

## 2020-12-10 ENCOUNTER — APPOINTMENT (OUTPATIENT)
Dept: CARDIOLOGY | Facility: CLINIC | Age: 30
End: 2020-12-10
Payer: COMMERCIAL

## 2020-12-10 ENCOUNTER — NON-APPOINTMENT (OUTPATIENT)
Age: 30
End: 2020-12-10

## 2020-12-10 ENCOUNTER — APPOINTMENT (OUTPATIENT)
Dept: NEPHROLOGY | Facility: CLINIC | Age: 30
End: 2020-12-10
Payer: COMMERCIAL

## 2020-12-10 ENCOUNTER — LABORATORY RESULT (OUTPATIENT)
Age: 30
End: 2020-12-10

## 2020-12-10 VITALS
SYSTOLIC BLOOD PRESSURE: 142 MMHG | HEIGHT: 63 IN | HEART RATE: 90 BPM | OXYGEN SATURATION: 98 % | WEIGHT: 192.9 LBS | BODY MASS INDEX: 34.18 KG/M2 | DIASTOLIC BLOOD PRESSURE: 90 MMHG

## 2020-12-10 VITALS
SYSTOLIC BLOOD PRESSURE: 122 MMHG | WEIGHT: 192 LBS | DIASTOLIC BLOOD PRESSURE: 78 MMHG | HEART RATE: 88 BPM | BODY MASS INDEX: 34.02 KG/M2 | OXYGEN SATURATION: 99 % | HEIGHT: 63 IN

## 2020-12-10 DIAGNOSIS — O14.20 HELLP SYNDROME (HELLP), UNSPECIFIED TRIMESTER: ICD-10-CM

## 2020-12-10 DIAGNOSIS — Z82.49 FAMILY HISTORY OF ISCHEMIC HEART DISEASE AND OTHER DISEASES OF THE CIRCULATORY SYSTEM: ICD-10-CM

## 2020-12-10 DIAGNOSIS — Z87.898 PERSONAL HISTORY OF OTHER SPECIFIED CONDITIONS: ICD-10-CM

## 2020-12-10 DIAGNOSIS — Z78.9 OTHER SPECIFIED HEALTH STATUS: ICD-10-CM

## 2020-12-10 DIAGNOSIS — Z87.891 PERSONAL HISTORY OF NICOTINE DEPENDENCE: ICD-10-CM

## 2020-12-10 DIAGNOSIS — Z83.49 FAMILY HISTORY OF OTHER ENDOCRINE, NUTRITIONAL AND METABOLIC DISEASES: ICD-10-CM

## 2020-12-10 DIAGNOSIS — O14.93 UNSPECIFIED PRE-ECLAMPSIA, THIRD TRIMESTER: ICD-10-CM

## 2020-12-10 DIAGNOSIS — N04.9 NEPHROTIC SYNDROME WITH UNSPECIFIED MORPHOLOGIC CHANGES: ICD-10-CM

## 2020-12-10 PROCEDURE — 99072 ADDL SUPL MATRL&STAF TM PHE: CPT

## 2020-12-10 PROCEDURE — 93000 ELECTROCARDIOGRAM COMPLETE: CPT

## 2020-12-10 PROCEDURE — 99214 OFFICE O/P EST MOD 30 MIN: CPT

## 2020-12-10 PROCEDURE — 99204 OFFICE O/P NEW MOD 45 MIN: CPT

## 2020-12-10 NOTE — HISTORY OF PRESENT ILLNESS
[FreeTextEntry1] : Patient is a 29yo woman with no significant PMH who presented to [facility] in setting of preeclampsia with HELLP syndrome.\par \par  delivered due to high blood pressure and PEC, 35 weeks, 3 days, emergent C section due to blood pressure  to 20 in hospital. Discharged home on Procardia 90 mg daily - BP at home has been 122-135/77-81. Pt is nursing\par \par Proteinuria was discovered approx one month prior, in her third trimester approx 31 weeks\par At approx 32-33 weeks she started having leg swelling, arms, facial, abdominal swelling which worsened.  \par Post delivery platelets dropped 198 to lowest  33K, then uptrended last platelets 81K\par last hb 8.6 \par alb 2.3\par \par \par \par NOTE: The infarcts measure at least 1 cm in greatest dimension,\par as measured microscopically; the largest gross\par dimension has not been documented.  Suggest clinical correlation.\par \par \par Patient was DC home on BP medication: nifedipine 90mg at 10pm and Lovenox 40mg and Motrin 600mg (taken twice so far); \par Pt is breastfeeding\par States hand and face swelling back to normal; leg swelling still there; \par denies foamy urine; \par c section scar healing\par weight is decreasing on its own 230-->192\par   pre pregenacy 150-160lb; 5'3"\par \par BP at home 122-135/73-87 at home\par 143/87\par \par Past medical hx: denies\par Past surgical hx: c section now\par Meds: denies prior; \par Fam hx: father HTN; mother hyperthroidism\par Soc hx: smoked 4cig/.day 5-6 years; prn marijuana; no etoh; ; \par this was first pregnancy;  elective \par

## 2020-12-10 NOTE — DISCUSSION/SUMMARY
[FreeTextEntry1] : Patient is a 29yo woman with no significant PMH who presented to [facility] in setting of preeclampsia with HELLP syndrome.\par \par 11/27 delivered due to high blood pressure and PEC, 35 weeks, 3 days, emergent C section due to blood pressure 11/27 to 12/2/20 in hospital. Discharged home on Procardia 90 mg daily - BP at home has been 122-135/77-81. Pt is nursing\par - BP stable. will decrease procardia to 60. Encouraged the patient to monitor blood pressure at home, keep a log, and report results back to us for evaluation. Based on results, we will adjust the regimen as necessary.\par - in 3 months post delivery will refer the patient for fasting routine blood work (to assess for other cardiovascular risk factors - DM, HLD, etc) and for an exercise stress test (to assess patient's baseline functional status and risk for ischemia/CAD)\par - ECG with no acute ischemic changes \par - blood work was done today at nephrology office\par - Encouraged patient to continue healthy exercise and eating habits, focusing on a Mediterranean style of eating and aiming for the recommended 150 minutes per week of moderate physical activity once cleared by OBGYN

## 2020-12-15 LAB
ALBUMIN MFR SERPL ELPH: 50.6 %
ALBUMIN SERPL ELPH-MCNC: 3.7 G/DL
ALBUMIN SERPL ELPH-MCNC: 3.7 G/DL
ALBUMIN SERPL-MCNC: 3.1 G/DL
ALBUMIN/GLOB SERPL: 1 RATIO
ALP BLD-CCNC: 183 U/L
ALPHA1 GLOB MFR SERPL ELPH: 6.2 %
ALPHA1 GLOB SERPL ELPH-MCNC: 0.4 G/DL
ALPHA2 GLOB MFR SERPL ELPH: 16.2 %
ALPHA2 GLOB SERPL ELPH-MCNC: 1 G/DL
ALT SERPL-CCNC: 37 U/L
ANA SER IF-ACNC: NEGATIVE
ANION GAP SERPL CALC-SCNC: 13 MMOL/L
APPEARANCE: ABNORMAL
APTT BLD: 39.7 SEC
AST SERPL-CCNC: 40 U/L
B-GLOBULIN MFR SERPL ELPH: 15.5 %
B-GLOBULIN SERPL ELPH-MCNC: 1 G/DL
B2 GLYCOPROT1 IGA SERPL IA-ACNC: <5 SAU
B2 GLYCOPROT1 IGG SER-ACNC: <5 SGU
B2 GLYCOPROT1 IGM SER-ACNC: <5 SMU
BACTERIA: ABNORMAL
BASOPHILS # BLD AUTO: 0.04 K/UL
BASOPHILS NFR BLD AUTO: 0.5 %
BILIRUB DIRECT SERPL-MCNC: 0.1 MG/DL
BILIRUB INDIRECT SERPL-MCNC: 0.3 MG/DL
BILIRUB SERPL-MCNC: 0.5 MG/DL
BILIRUBIN URINE: ABNORMAL
BLOOD URINE: ABNORMAL
BUN SERPL-MCNC: 8 MG/DL
C3 SERPL-MCNC: 194 MG/DL
C4 SERPL-MCNC: 63 MG/DL
CALCIUM SERPL-MCNC: 9 MG/DL
CARDIOLIPIN AB SER IA-ACNC: NEGATIVE
CARDIOLIPIN IGM SER-MCNC: 10.5 MPL
CARDIOLIPIN IGM SER-MCNC: <5 GPL
CHLORIDE SERPL-SCNC: 105 MMOL/L
CO2 SERPL-SCNC: 23 MMOL/L
COLOR: YELLOW
CONFIRM: 28.9 SEC
CREAT SERPL-MCNC: 0.85 MG/DL
CREAT SPEC-SCNC: 280 MG/DL
CREAT/PROT UR: 4.6 RATIO
DEPRECATED CARDIOLIPIN IGA SER: <5 APL
DEPRECATED KAPPA LC FREE/LAMBDA SER: 1.02 RATIO
DRVVT IMM 1:2 NP PPP: NORMAL
DRVVT SCREEN TO CONFIRM RATIO: 1.04 RATIO
DSDNA AB SER-ACNC: <12 IU/ML
EOSINOPHIL # BLD AUTO: 0.06 K/UL
EOSINOPHIL NFR BLD AUTO: 0.8 %
ESTIMATED AVERAGE GLUCOSE: 100 MG/DL
FERRITIN SERPL-MCNC: 452 NG/ML
GAMMA GLOB FLD ELPH-MCNC: 0.7 G/DL
GAMMA GLOB MFR SERPL ELPH: 11.5 %
GBM AB TITR SER IF: 3
GLUCOSE QUALITATIVE U: NEGATIVE
GLUCOSE SERPL-MCNC: 82 MG/DL
HAPTOGLOB SERPL-MCNC: 155 MG/DL
HBA1C MFR BLD HPLC: 5.1 %
HBV CORE IGG+IGM SER QL: NONREACTIVE
HBV CORE IGM SER QL: NONREACTIVE
HBV SURFACE AB SER QL: REACTIVE
HBV SURFACE AB SERPL IA-ACNC: 17.6 MIU/ML
HCT VFR BLD CALC: 31.6 %
HCV AB SER QL: NONREACTIVE
HCV S/CO RATIO: 0.24 S/CO
HGB BLD-MCNC: 10 G/DL
HIV1+2 AB SPEC QL IA.RAPID: NONREACTIVE
HYALINE CASTS: 0 /LPF
IMM GRANULOCYTES NFR BLD AUTO: 0.4 %
INR PPP: 0.98 RATIO
INTERPRETATION SERPL IEP-IMP: NORMAL
IRON SATN MFR SERPL: 24 %
IRON SERPL-MCNC: 86 UG/DL
KAPPA LC CSF-MCNC: 2.23 MG/DL
KAPPA LC SERPL-MCNC: 2.27 MG/DL
KETONES URINE: ABNORMAL
LDH SERPL-CCNC: 306 U/L
LEUKOCYTE ESTERASE URINE: NEGATIVE
LYMPHOCYTES # BLD AUTO: 2.26 K/UL
LYMPHOCYTES NFR BLD AUTO: 29.5 %
M PROTEIN SPEC IFE-MCNC: NORMAL
MAGNESIUM SERPL-MCNC: 2 MG/DL
MAN DIFF?: NORMAL
MCHC RBC-ENTMCNC: 31.6 GM/DL
MCHC RBC-ENTMCNC: 32.5 PG
MCV RBC AUTO: 102.6 FL
MICROSCOPIC-UA: NORMAL
MONOCYTES # BLD AUTO: 0.49 K/UL
MONOCYTES NFR BLD AUTO: 6.4 %
MPO AB + PR3 PNL SER: NORMAL
NEUTROPHILS # BLD AUTO: 4.77 K/UL
NEUTROPHILS NFR BLD AUTO: 62.4 %
NITRITE URINE: NEGATIVE
PH URINE: 6
PHOSPHATE SERPL-MCNC: 3.4 MG/DL
PHOSPHOLIPASE A2 RECEPTOR ELISA: <2 RU/ML
PHOSPHOLIPASE A2 RECEPTOR IFA: NEGATIVE
PLATELET # BLD AUTO: 373 K/UL
POTASSIUM SERPL-SCNC: 4.1 MMOL/L
PROT SERPL-MCNC: 6.2 G/DL
PROT UR-MCNC: 1290 MG/DL
PROTEIN URINE: ABNORMAL
PT BLD: 11.6 SEC
RBC # BLD: 3.08 M/UL
RBC # FLD: 15 %
RED BLOOD CELLS URINE: 13 /HPF
RPR SER-TITR: NORMAL
SCREEN DRVVT: 33.2 SEC
SODIUM SERPL-SCNC: 141 MMOL/L
SPECIFIC GRAVITY URINE: >=1.03
SQUAMOUS EPITHELIAL CELLS: 8 /HPF
TIBC SERPL-MCNC: 361 UG/DL
UIBC SERPL-MCNC: 276 UG/DL
URATE SERPL-MCNC: 6.3 MG/DL
UROBILINOGEN URINE: NORMAL
WBC # FLD AUTO: 7.65 K/UL
WHITE BLOOD CELLS URINE: 60 /HPF

## 2020-12-16 PROBLEM — O14.20 HELLP SYNDROME: Status: ACTIVE | Noted: 2020-12-10

## 2020-12-16 PROBLEM — Z87.891 FORMER SMOKER: Status: ACTIVE | Noted: 2020-12-16

## 2020-12-16 PROBLEM — Z87.898 HISTORY OF MARIJUANA USE: Status: ACTIVE | Noted: 2020-12-16

## 2020-12-16 PROBLEM — Z78.9 DENIES ALCOHOL CONSUMPTION: Status: ACTIVE | Noted: 2020-12-16

## 2020-12-16 PROBLEM — N04.9 NEPHROTIC SYNDROME: Status: ACTIVE | Noted: 2020-12-16

## 2020-12-16 LAB — VASCULAR ENDOTHELIAL GF: ABNORMAL

## 2020-12-16 NOTE — PHYSICAL EXAM
[General Appearance - Alert] : alert [General Appearance - In No Acute Distress] : in no acute distress [General Appearance - Well Nourished] : well nourished [General Appearance - Well Developed] : well developed [General Appearance - Well-Appearing] : healthy appearing [Sclera] : the sclera and conjunctiva were normal [Outer Ear] : the ears and nose were normal in appearance [Neck Appearance] : the appearance of the neck was normal [Respiration, Rhythm And Depth] : normal respiratory rhythm and effort [Auscultation Breath Sounds / Voice Sounds] : lungs were clear to auscultation bilaterally [FreeTextEntry1] : 1+ to 2+ B/l LE to knees [Bowel Sounds] : normal bowel sounds [Abdomen Soft] : soft [Skin Color & Pigmentation] : normal skin color and pigmentation [Skin Turgor] : normal skin turgor [] : no rash [No Focal Deficits] : no focal deficits [Oriented To Time, Place, And Person] : oriented to person, place, and time [Impaired Insight] : insight and judgment were intact

## 2020-12-16 NOTE — ASSESSMENT
[FreeTextEntry1] : 30 year old female delivered for suspected superimposed PEC on Nephrotic Syndrome\par  Patient remembers exact onset of time for proteinuria which started in the 3rd trimester and then 2 weeks prior to delivery she reports sudden onset of progressive worsening leg edema, hand edema, facial edema, and increased abdominal girth.  Delivered via c section due to new onset of hypertension discovered at OB office\par  \par #Suspect Nephrotic syndrome/ Nephrotic Range proteinuria/superimposed PEC- \par complete GN workup today\par  renal sono with duplex of renal veins to r/o renal vein thrombosis was negative in hospital\par  lipid panel abnl\par may need renal biopsy;  \par  c3,c4, pla2r , hbv, hcv, hiv neg in hospital \par [id number] 49.9 low- -  repeat [id number]; check phospholipids\par repeat spep, free light chains, JIN\par spep weak gamma migrating paraprotein with high kappa slightly but nl ratio \par immunofixation weak igg kappa \par lupus anticoagulant was positive in hospital \par repeat urine prot/cr \par assess need for renal biopsy in next 2-3 weeks\par degree of proteinuria/hypoalbuminemia seems atypical for PEC \par \par #edema/anasarca- stable per patient- pt with good self diuresis; weight is trending down on its own; anasarca has improved since hospital; no need for diuretics; \par \par #superimposed PEC/HELLPS- the degree of edema and proteinuria that she presented with does not appear to be all PEC alone; monitor HELLPS labs;  \par \par #HTN management-   nifedipine to 90mg daily; monitor BP trend at home; may need to increase or decrease accordingly\par \par #advised to stop Motrin- she only took two days/two doses\par \par # she is on lovenox\par \par \par \par \par stop motrin\par \par LA \par \par 122/88

## 2020-12-16 NOTE — HISTORY OF PRESENT ILLNESS
[FreeTextEntry1] : 30 year old female with past medical history \par States that  she was delivered due to high blood pressure and PEC at 35 weeks and 3 days.  She had an emergent C section due to blood pressure/PEC/HELLPS.  She was in hospital from  to 20 Patient stated that proteinuria was discovered approx one month prior, in her third trimester at approx 31 weeks.  At approximately 32-33 weeks she started having leg swelling, arms, facial, abdominal swelling which worsened and progressed upward.  \par Post delivery, her platelets dropped 198 to lowest on  33K, then uptrended with last platelets 81K\par last hb 8.6; alb 2.3; cr .77 \par prot/cr 5.1g par She was seen by nephrology as an inpatient who suspected underlying Nephrotic Syndrome with possible superimposed PEC\par c3,c4, pla2r , hbv, hcv, hiv neg \par [id number] 49.9 low \par spep weak gamma migrating paraprotein with high kappa slightly but nl ratio \par immunofixation weak igg kappa \par FINDINGS: Right kidney:  11.9 cm. No renal mass, hydronephrosis or calculi.\par Left kidney:  10.6 cm. No renal mass, hydronephrosis or calculi.\par NO RVT\par Urinary bladder: Within normal limits.\par Color and spectral Doppler reveals normal, symmetric blood flow throughout both kidneys.\par Peak aortic velocity is 91 cm/sec.\par IVC/Renal Veins: Patent.\par RIGHT\par Renal Artery:\par Peak systolic velocity is 96 cm/sec origin, 100 cm/sec proximal, 101 cm/sec mid, 99 cm/sec distal and 123 cm/sec hilum.\par Upper Segmental Artery:  RI = 0.67\par Middle Segmental Artery: RI = 0.68\par Lower Segmental Arery: RI = 0.68\par LEFT\par Renal Artery:\par Peak systolic velocity is 75 cm/sec origin, 84 cm/sec proximal, 89 cm/sec mid, 105 cm/sec distal and 113 cm/sec hilum.\par Upper Segmental Artery:  RI = 0.66\par Middle Segmental Artery: RI = 0.76\par Lower Segmental Artery: RI = 0.71\par Miscellaneous: Small bilateral pleural effusions. Increased echogenicity of the visualized liver, may reflect steatosis. Trace perihepatic fluid. Nonspecific gallbladder wall thickening. Negative sonographic Tolbert sign. IMPRESSION: \par No evidence of a significant renal artery stenosis.\par Patent renal veins.\par Incidental findings as above.\par \par Pathology Placenta Final Diagnosis\par Placenta (35 3/7 weeks),  section delivery, with:\par Placental weight between 5th and 10th percentile for\par gestational age.\par Umbilical cord containing three blood vessels.\par Multiple placental infarcts, one hemorrhagic (maternal\par vascular malperfusion; see\par note).\par NOTE: The infarcts measure at least 1 cm in greatest dimension,\par as measured microscopically; the largest gross\par dimension has not been documented.  Suggest clinical correlation.\par \par \par Patient was DC home on BP medication: nifedipine 90mg at 10pm and Lovenox 40mg and Motrin 600mg (taken twice so far); \par Pt is breastfeeding\par States hand and face swelling back to normal; leg swelling still present; weight is decreasing\par denies foamy urine; \par c section scar healing\par weight is decreasing on its own 230-->192\par   pre pregenacy 150-160lb; 5'3"\par \par BP at home 122-135/73-87 at home\par 143/87\par \par Past medical hx: denies\par Past surgical hx: c section now\par Meds: denies prior; \par Fam hx: father HTN; mother hyperthyroidism\par Soc hx: smoked 4cig/.day 5-6 years; PRN marijuana; no ETOH; ; \par this was first pregnancy;  elective  and D and C\par \par \par

## 2021-01-05 LAB
ALBUMIN SERPL ELPH-MCNC: 3.9 G/DL
ANION GAP SERPL CALC-SCNC: 13 MMOL/L
APPEARANCE: ABNORMAL
APTT BLD: 38.5 SEC
BACTERIA: ABNORMAL
BASOPHILS # BLD AUTO: 0.04 K/UL
BASOPHILS NFR BLD AUTO: 0.7 %
BILIRUBIN URINE: NEGATIVE
BLOOD URINE: ABNORMAL
BUN SERPL-MCNC: 8 MG/DL
CALCIUM SERPL-MCNC: 9.2 MG/DL
CHLORIDE SERPL-SCNC: 105 MMOL/L
CO2 SERPL-SCNC: 24 MMOL/L
COLOR: YELLOW
CREAT SERPL-MCNC: 0.9 MG/DL
CREAT SPEC-SCNC: 130 MG/DL
CREAT/PROT UR: 1.7 RATIO
DEPRECATED KAPPA LC FREE/LAMBDA SER: 1.06 RATIO
EOSINOPHIL # BLD AUTO: 0.16 K/UL
EOSINOPHIL NFR BLD AUTO: 2.9 %
FERRITIN SERPL-MCNC: 130 NG/ML
GLUCOSE QUALITATIVE U: NEGATIVE
GLUCOSE SERPL-MCNC: 96 MG/DL
HCT VFR BLD CALC: 39 %
HGB BLD-MCNC: 12.4 G/DL
HYALINE CASTS: 0 /LPF
IMM GRANULOCYTES NFR BLD AUTO: 0.2 %
INR PPP: 0.97 RATIO
IRON SATN MFR SERPL: 22 %
IRON SERPL-MCNC: 72 UG/DL
KAPPA LC CSF-MCNC: 2.15 MG/DL
KAPPA LC SERPL-MCNC: 2.27 MG/DL
KETONES URINE: NEGATIVE
LDH SERPL-CCNC: 166 U/L
LEUKOCYTE ESTERASE URINE: NEGATIVE
LYMPHOCYTES # BLD AUTO: 2.66 K/UL
LYMPHOCYTES NFR BLD AUTO: 47.9 %
MAN DIFF?: NORMAL
MCHC RBC-ENTMCNC: 31.6 PG
MCHC RBC-ENTMCNC: 31.8 GM/DL
MCV RBC AUTO: 99.2 FL
MICROSCOPIC-UA: NORMAL
MONOCYTES # BLD AUTO: 0.48 K/UL
MONOCYTES NFR BLD AUTO: 8.6 %
NEUTROPHILS # BLD AUTO: 2.2 K/UL
NEUTROPHILS NFR BLD AUTO: 39.7 %
NITRITE URINE: NEGATIVE
PH URINE: 6
PHOSPHATE SERPL-MCNC: 3.8 MG/DL
PLATELET # BLD AUTO: 238 K/UL
POTASSIUM SERPL-SCNC: 4.1 MMOL/L
PROT UR-MCNC: 225 MG/DL
PROTEIN URINE: ABNORMAL
PT BLD: 11.5 SEC
RBC # BLD: 3.93 M/UL
RBC # FLD: 12.5 %
RED BLOOD CELLS URINE: 6 /HPF
SODIUM SERPL-SCNC: 142 MMOL/L
SPECIFIC GRAVITY URINE: 1.02
SQUAMOUS EPITHELIAL CELLS: 6 /HPF
TIBC SERPL-MCNC: 332 UG/DL
UIBC SERPL-MCNC: 261 UG/DL
UROBILINOGEN URINE: NORMAL
WBC # FLD AUTO: 5.55 K/UL
WHITE BLOOD CELLS URINE: 11 /HPF

## 2021-01-11 ENCOUNTER — TRANSCRIPTION ENCOUNTER (OUTPATIENT)
Age: 31
End: 2021-01-11

## 2021-01-20 ENCOUNTER — APPOINTMENT (OUTPATIENT)
Dept: CARDIOLOGY | Facility: CLINIC | Age: 31
End: 2021-01-20
Payer: COMMERCIAL

## 2021-01-20 PROCEDURE — 99214 OFFICE O/P EST MOD 30 MIN: CPT

## 2021-01-20 PROCEDURE — 99072 ADDL SUPL MATRL&STAF TM PHE: CPT

## 2021-01-20 PROCEDURE — 93000 ELECTROCARDIOGRAM COMPLETE: CPT

## 2021-01-20 NOTE — PHYSICAL EXAM
[General Appearance - Well Developed] : well developed [Normal Appearance] : normal appearance [Well Groomed] : well groomed [General Appearance - Well Nourished] : well nourished [No Deformities] : no deformities [General Appearance - In No Acute Distress] : no acute distress [Normal Conjunctiva] : the conjunctiva exhibited no abnormalities [Eyelids - No Xanthelasma] : the eyelids demonstrated no xanthelasmas [Normal Oral Mucosa] : normal oral mucosa [No Oral Pallor] : no oral pallor [No Oral Cyanosis] : no oral cyanosis [Normal Jugular Venous A Waves Present] : normal jugular venous A waves present [Normal Jugular Venous V Waves Present] : normal jugular venous V waves present [No Jugular Venous Wilson A Waves] : no jugular venous wilson A waves [Respiration, Rhythm And Depth] : normal respiratory rhythm and effort [Exaggerated Use Of Accessory Muscles For Inspiration] : no accessory muscle use [Auscultation Breath Sounds / Voice Sounds] : lungs were clear to auscultation bilaterally [Heart Sounds] : normal S1 and S2 [Heart Rate And Rhythm] : heart rate and rhythm were normal [Murmurs] : no murmurs present [Abdomen Soft] : soft [Abdomen Tenderness] : non-tender [Abdomen Mass (___ Cm)] : no abdominal mass palpated [Abnormal Walk] : normal gait [Gait - Sufficient For Exercise Testing] : the gait was sufficient for exercise testing [Nail Clubbing] : no clubbing of the fingernails [Cyanosis, Localized] : no localized cyanosis [Petechial Hemorrhages (___cm)] : no petechial hemorrhages [Skin Color & Pigmentation] : normal skin color and pigmentation [] : no rash [No Venous Stasis] : no venous stasis [Skin Lesions] : no skin lesions [No Skin Ulcers] : no skin ulcer [No Xanthoma] : no  xanthoma was observed [Oriented To Time, Place, And Person] : oriented to person, place, and time [Affect] : the affect was normal [Mood] : the mood was normal [No Anxiety] : not feeling anxious

## 2021-02-08 NOTE — OB PROVIDER H&P - NSCORESITESY/N_GEN_A_CORE_RD
I signed for samples on January 26.  Please check with our drug rep [phone] on status of where the samples are. thank you   No

## 2021-02-10 LAB
ALBUMIN SERPL ELPH-MCNC: 4.4 G/DL
ANION GAP SERPL CALC-SCNC: 15 MMOL/L
APPEARANCE: ABNORMAL
APTT BLD: 36 SEC
BACTERIA: ABNORMAL
BASOPHILS # BLD AUTO: 0.03 K/UL
BASOPHILS NFR BLD AUTO: 0.4 %
BILIRUBIN URINE: NEGATIVE
BLOOD URINE: ABNORMAL
BUN SERPL-MCNC: 11 MG/DL
CALCIUM OXALATE CRYSTALS: ABNORMAL
CALCIUM SERPL-MCNC: 9.9 MG/DL
CHLORIDE SERPL-SCNC: 105 MMOL/L
CO2 SERPL-SCNC: 23 MMOL/L
COLOR: NORMAL
CREAT SERPL-MCNC: 0.83 MG/DL
CREAT SPEC-SCNC: 213 MG/DL
CREAT/PROT UR: 1.2 RATIO
EOSINOPHIL # BLD AUTO: 0.09 K/UL
EOSINOPHIL NFR BLD AUTO: 1.3 %
FERRITIN SERPL-MCNC: 97 NG/ML
GLUCOSE QUALITATIVE U: NEGATIVE
GLUCOSE SERPL-MCNC: 103 MG/DL
HCT VFR BLD CALC: 42.1 %
HGB BLD-MCNC: 13.3 G/DL
HYALINE CASTS: 4 /LPF
IMM GRANULOCYTES NFR BLD AUTO: 0.1 %
INR PPP: 0.92 RATIO
IRON SATN MFR SERPL: 20 %
IRON SERPL-MCNC: 64 UG/DL
KETONES URINE: NEGATIVE
LEUKOCYTE ESTERASE URINE: NEGATIVE
LYMPHOCYTES # BLD AUTO: 2.92 K/UL
LYMPHOCYTES NFR BLD AUTO: 42.8 %
MAN DIFF?: NORMAL
MCHC RBC-ENTMCNC: 31.1 PG
MCHC RBC-ENTMCNC: 31.6 GM/DL
MCV RBC AUTO: 98.6 FL
MICROSCOPIC-UA: NORMAL
MONOCYTES # BLD AUTO: 0.58 K/UL
MONOCYTES NFR BLD AUTO: 8.5 %
NEUTROPHILS # BLD AUTO: 3.19 K/UL
NEUTROPHILS NFR BLD AUTO: 46.9 %
NITRITE URINE: NEGATIVE
PH URINE: 5.5
PHOSPHATE SERPL-MCNC: 4.5 MG/DL
PLATELET # BLD AUTO: 228 K/UL
POTASSIUM SERPL-SCNC: 4 MMOL/L
PROT UR-MCNC: 258 MG/DL
PROTEIN URINE: ABNORMAL
PT BLD: 11 SEC
RBC # BLD: 4.27 M/UL
RBC # FLD: 11.9 %
RED BLOOD CELLS URINE: 5 /HPF
SODIUM SERPL-SCNC: 143 MMOL/L
SPECIFIC GRAVITY URINE: >=1.03
SQUAMOUS EPITHELIAL CELLS: 5 /HPF
TIBC SERPL-MCNC: 323 UG/DL
UIBC SERPL-MCNC: 258 UG/DL
UROBILINOGEN URINE: NORMAL
WBC # FLD AUTO: 6.82 K/UL
WHITE BLOOD CELLS URINE: 5 /HPF

## 2021-02-16 NOTE — HISTORY OF PRESENT ILLNESS
[FreeTextEntry1] : Patient is a 31 yo woman with no significant PMH who presented to establish care in setting of preeclampsia with HELLP syndrome.\par \par  delivered due to high blood pressure and PEC, 35 weeks, 3 days, emergent C section due to blood pressure  to 20 in hospital. Discharged home on Procardia 90 mg daily - BP at home has been 122-135/77-81. Pt is nursing\par \par Proteinuria was discovered approx one month prior, in her third trimester approx 31 weeks\par At approx 32-33 weeks she started having leg swelling, arms, facial, abdominal swelling which worsened.  \par Post delivery platelets dropped 198 to lowest  33K, then uptrended last platelets 81K\par last hb 8.6 \par alb 2.3\par \par \par \par NOTE: The infarcts measure at least 1 cm in greatest dimension,\par as measured microscopically; the largest gross\par dimension has not been documented.  Suggest clinical correlation.\par \par \par Patient was DC home on BP medication: nifedipine 90mg at 10pm and Lovenox 40mg and Motrin 600mg (taken twice so far); \par Pt is breastfeeding\par States hand and face swelling back to normal; leg swelling still there; \par denies foamy urine; \par c section scar healing\par weight is decreasing on its own 230-->192\par   pre pregenacy 150-160lb; 5'3"\par \par BP at home 122-135/73-87 at home\par 143/87\par \par Past medical hx: denies\par Past surgical hx: c section now\par Meds: denies prior; \par Fam hx: father HTN; mother hyperthroidism\par Soc hx: smoked 4cig/.day 5-6 years; prn marijuana; no etoh; ; \par this was first pregnancy;  elective \par

## 2021-02-16 NOTE — DISCUSSION/SUMMARY
[FreeTextEntry1] : Patient is a 29 yo woman with no significant PMH who presented to establish care in setting of preeclampsia with HELLP syndrome.\par \par 11/27 delivered due to high blood pressure and PEC, 35 weeks, 3 days, emergent C section due to blood pressure 11/27 to 12/2/20 in hospital. Discharged home on Procardia 90 mg daily - BP at home has been 122-135/77-81. Pt is nursing\par - BP stable. will decrease procardia to 60. Encouraged the patient to monitor blood pressure at home, keep a log, and report results back to us for evaluation. Based on results, we will adjust the regimen as necessary.\par - in 3 months post delivery will refer the patient for fasting routine blood work (to assess for other cardiovascular risk factors - DM, HLD, etc) and for an exercise stress test (to assess patient's baseline functional status and risk for ischemia/CAD)\par - ECG with no acute ischemic changes \par - blood work was done today at nephrology office\par - Encouraged patient to continue healthy exercise and eating habits, focusing on a Mediterranean style of eating and aiming for the recommended 150 minutes per week of moderate physical activity once cleared by OBGYN

## 2021-02-19 ENCOUNTER — OUTPATIENT (OUTPATIENT)
Dept: OUTPATIENT SERVICES | Facility: HOSPITAL | Age: 31
LOS: 1 days | Discharge: HOME | End: 2021-02-19

## 2021-02-19 DIAGNOSIS — R79.1 ABNORMAL COAGULATION PROFILE: ICD-10-CM

## 2021-02-19 DIAGNOSIS — O03.9 COMPLETE OR UNSPECIFIED SPONTANEOUS ABORTION WITHOUT COMPLICATION: Chronic | ICD-10-CM

## 2021-02-19 DIAGNOSIS — Z98.890 OTHER SPECIFIED POSTPROCEDURAL STATES: Chronic | ICD-10-CM

## 2021-02-22 ENCOUNTER — RESULT REVIEW (OUTPATIENT)
Age: 31
End: 2021-02-22

## 2021-02-22 ENCOUNTER — APPOINTMENT (OUTPATIENT)
Dept: HEMATOLOGY ONCOLOGY | Facility: CLINIC | Age: 31
End: 2021-02-22
Payer: COMMERCIAL

## 2021-02-22 VITALS
WEIGHT: 185.85 LBS | HEART RATE: 78 BPM | TEMPERATURE: 97 F | BODY MASS INDEX: 32.93 KG/M2 | DIASTOLIC BLOOD PRESSURE: 78 MMHG | HEIGHT: 62.99 IN | SYSTOLIC BLOOD PRESSURE: 115 MMHG | OXYGEN SATURATION: 98 % | RESPIRATION RATE: 17 BRPM

## 2021-02-22 LAB
BASOPHILS # BLD AUTO: 0.03 K/UL — SIGNIFICANT CHANGE UP (ref 0–0.2)
BASOPHILS NFR BLD AUTO: 0.5 % — SIGNIFICANT CHANGE UP (ref 0–2)
EOSINOPHIL # BLD AUTO: 0.08 K/UL — SIGNIFICANT CHANGE UP (ref 0–0.5)
EOSINOPHIL NFR BLD AUTO: 1.3 % — SIGNIFICANT CHANGE UP (ref 0–6)
HCT VFR BLD CALC: 41.8 % — SIGNIFICANT CHANGE UP (ref 34.5–45)
HGB BLD-MCNC: 14 G/DL — SIGNIFICANT CHANGE UP (ref 11.5–15.5)
IMM GRANULOCYTES NFR BLD AUTO: 0.2 % — SIGNIFICANT CHANGE UP (ref 0–1.5)
LYMPHOCYTES # BLD AUTO: 2.53 K/UL — SIGNIFICANT CHANGE UP (ref 1–3.3)
LYMPHOCYTES # BLD AUTO: 42 % — SIGNIFICANT CHANGE UP (ref 13–44)
MCHC RBC-ENTMCNC: 31 PG — SIGNIFICANT CHANGE UP (ref 27–34)
MCHC RBC-ENTMCNC: 33.5 G/DL — SIGNIFICANT CHANGE UP (ref 32–36)
MCV RBC AUTO: 92.5 FL — SIGNIFICANT CHANGE UP (ref 80–100)
MONOCYTES # BLD AUTO: 0.48 K/UL — SIGNIFICANT CHANGE UP (ref 0–0.9)
MONOCYTES NFR BLD AUTO: 8 % — SIGNIFICANT CHANGE UP (ref 2–14)
NEUTROPHILS # BLD AUTO: 2.9 K/UL — SIGNIFICANT CHANGE UP (ref 1.8–7.4)
NEUTROPHILS NFR BLD AUTO: 48 % — SIGNIFICANT CHANGE UP (ref 43–77)
NRBC # BLD: 0 /100 WBCS — SIGNIFICANT CHANGE UP (ref 0–0)
PLATELET # BLD AUTO: 215 K/UL — SIGNIFICANT CHANGE UP (ref 150–400)
RBC # BLD: 4.52 M/UL — SIGNIFICANT CHANGE UP (ref 3.8–5.2)
RBC # FLD: 11.9 % — SIGNIFICANT CHANGE UP (ref 10.3–14.5)
RETICS #: 62.8 K/UL — SIGNIFICANT CHANGE UP (ref 25–125)
RETICS/RBC NFR: 1.4 % — SIGNIFICANT CHANGE UP (ref 0.5–2.5)
WBC # BLD: 6.03 K/UL — SIGNIFICANT CHANGE UP (ref 3.8–10.5)
WBC # FLD AUTO: 6.03 K/UL — SIGNIFICANT CHANGE UP (ref 3.8–10.5)

## 2021-02-22 PROCEDURE — 99214 OFFICE O/P EST MOD 30 MIN: CPT

## 2021-02-22 PROCEDURE — 99072 ADDL SUPL MATRL&STAF TM PHE: CPT

## 2021-02-27 DIAGNOSIS — Z01.818 ENCOUNTER FOR OTHER PREPROCEDURAL EXAMINATION: ICD-10-CM

## 2021-02-28 ENCOUNTER — APPOINTMENT (OUTPATIENT)
Dept: DISASTER EMERGENCY | Facility: CLINIC | Age: 31
End: 2021-02-28

## 2021-02-28 LAB — SARS-COV-2 N GENE NPH QL NAA+PROBE: NOT DETECTED

## 2021-03-01 LAB
ALBUMIN SERPL ELPH-MCNC: 4.3 G/DL
ALP BLD-CCNC: 94 U/L
ALT SERPL-CCNC: 63 U/L
ANION GAP SERPL CALC-SCNC: 13 MMOL/L
APTT 2H P 1:4 NP PPP: 35.8 SEC
APTT 2H P INC PPP: 36.1 SEC
APTT BLD: 36.3 SEC
APTT IMM NP/PRE NP PPP: 34.5 SEC
APTT INV RATIO PPP: 36.3 SEC
AST SERPL-CCNC: 48 U/L
B2 GLYCOPROT1 IGG SER-ACNC: <5 SGU
B2 GLYCOPROT1 IGM SER-ACNC: <5 SMU
BILIRUB SERPL-MCNC: 0.5 MG/DL
BUN SERPL-MCNC: 11 MG/DL
CALCIUM SERPL-MCNC: 9.7 MG/DL
CARDIOLIPIN IGM SER-MCNC: 7.1 MPL
CARDIOLIPIN IGM SER-MCNC: <5 GPL
CHLORIDE SERPL-SCNC: 103 MMOL/L
CO2 SERPL-SCNC: 23 MMOL/L
CONFIRM: 26.6 SEC
CREAT SERPL-MCNC: 0.66 MG/DL
DRVVT IMM 1:2 NP PPP: NORMAL
DRVVT SCREEN TO CONFIRM RATIO: 0.93 RATIO
FACT IX ACT/NOR PPP: 114 %
FACT VIII ACT/NOR PPP: 97 %
FACT XI ACT/NOR PPP: 143 %
FACT XII PPP CHRO-ACNC: 75 %
FIBRINOGEN PPP COAG.DERIVED-MCNC: 568 MG/DL
GLUCOSE SERPL-MCNC: 104 MG/DL
INR PPP: 0.95 RATIO
NPP NORMAL POOLED PLASMA: 33.9 SECS
POTASSIUM SERPL-SCNC: 3.7 MMOL/L
PROT SERPL-MCNC: 6.9 G/DL
PT BLD: 11.3 SEC
SCREEN DRVVT: 33.1 SEC
SILICA CLOTTING TIME INTERPRETATION: NORMAL
SILICA CLOTTING TIME S/C: 1.1 RATIO
SODIUM SERPL-SCNC: 139 MMOL/L
VWF AG PPP IA-ACNC: 98 %
VWF:RCO ACT/NOR PPP PL AGG: 78 %

## 2021-03-03 ENCOUNTER — OUTPATIENT (OUTPATIENT)
Dept: OUTPATIENT SERVICES | Facility: HOSPITAL | Age: 31
LOS: 1 days | End: 2021-03-03
Payer: COMMERCIAL

## 2021-03-03 ENCOUNTER — APPOINTMENT (OUTPATIENT)
Dept: CARDIOLOGY | Facility: CLINIC | Age: 31
End: 2021-03-03
Payer: COMMERCIAL

## 2021-03-03 ENCOUNTER — APPOINTMENT (OUTPATIENT)
Dept: CV DIAGNOSTICS | Facility: HOSPITAL | Age: 31
End: 2021-03-03

## 2021-03-03 DIAGNOSIS — Z98.890 OTHER SPECIFIED POSTPROCEDURAL STATES: Chronic | ICD-10-CM

## 2021-03-03 DIAGNOSIS — Z00.00 ENCOUNTER FOR GENERAL ADULT MEDICAL EXAMINATION WITHOUT ABNORMAL FINDINGS: ICD-10-CM

## 2021-03-03 DIAGNOSIS — O03.9 COMPLETE OR UNSPECIFIED SPONTANEOUS ABORTION WITHOUT COMPLICATION: Chronic | ICD-10-CM

## 2021-03-03 PROCEDURE — 99072 ADDL SUPL MATRL&STAF TM PHE: CPT

## 2021-03-03 PROCEDURE — 99214 OFFICE O/P EST MOD 30 MIN: CPT

## 2021-03-03 PROCEDURE — 93017 CV STRESS TEST TRACING ONLY: CPT

## 2021-03-03 PROCEDURE — 93016 CV STRESS TEST SUPVJ ONLY: CPT

## 2021-03-03 PROCEDURE — 93018 CV STRESS TEST I&R ONLY: CPT

## 2021-03-03 NOTE — HISTORY OF PRESENT ILLNESS
[FreeTextEntry1] : Patient is a 31 yo woman with no significant PMH who presented to establish care in setting of preeclampsia with HELLP syndrome.\par \par  delivered due to high blood pressure and PEC, 35 weeks, 3 days, emergent C section due to blood pressure  to 20 in hospital. Discharged home on Procardia 90 mg daily - BP at home has been 122-135/77-81. Pt is nursing\par \par Proteinuria was discovered approx one month prior, in her third trimester approx 31 weeks\par At approx 32-33 weeks she started having leg swelling, arms, facial, abdominal swelling which worsened.  \par Post delivery platelets dropped 198 to lowest  33K, then uptrended last platelets 81K\par last hb 8.6 \par alb 2.3\par \par \par \par NOTE: The infarcts measure at least 1 cm in greatest dimension,\par as measured microscopically; the largest gross\par dimension has not been documented.  Suggest clinical correlation.\par \par \par Patient was DC home on BP medication: nifedipine 90mg at 10pm and Lovenox 40mg and Motrin 600mg (taken twice so far); \par Pt is breastfeeding\par States hand and face swelling back to normal; leg swelling still there; \par denies foamy urine; \par c section scar healing\par weight is decreasing on its own 230-->192\par   pre pregenacy 150-160lb; 5'3"\par \par BP at home 120s/70s\par \par Past medical hx: denies\par Past surgical hx: c section now\par Meds: denies prior; \par Fam hx: father HTN; mother hyperthroidism\par Soc hx: smoked 4cig/.day 5-6 years; prn marijuana; no etoh; ; \par this was first pregnancy;  elective \par

## 2021-03-03 NOTE — DISCUSSION/SUMMARY
[FreeTextEntry1] : Patient is a 29 yo woman with no significant PMH who presented to establish care in setting of preeclampsia with HELLP syndrome.\par \par 11/27 delivered due to high blood pressure and PEC, 35 weeks, 3 days, emergent C section due to blood pressure 11/27 to 12/2/20 in hospital. Discharged home on Procardia 90 mg daily - BP at home has been 122-135/77-81. Pt is nursing\par - BP stable. will wean off of the procardia. will notify and resume meds if > 140/90\par - stress test done today - will follow up with the results\par - ECG with no acute ischemic changes \par - blood work was done today at nephrology office\par - Encouraged patient to continue healthy exercise and eating habits, focusing on a Mediterranean style of eating and aiming for the recommended 150 minutes per week of moderate physical activity once cleared by OBGYN

## 2021-03-09 LAB — VWF MULTIMERS PPP IA-ACNC: NORMAL

## 2021-03-18 NOTE — PHYSICAL EXAM
[Normal] : normal appearance, no rash, nodules, vesicles, ulcers, erythema [de-identified] : C section incision well healed.

## 2021-03-18 NOTE — REVIEW OF SYSTEMS
[Negative] : Constitutional [FreeTextEntry7] : Soreness at C section site.   [FreeTextEntry8] : mild vaginal bleeding.

## 2021-03-18 NOTE — HISTORY OF PRESENT ILLNESS
[de-identified] : This is 30 year old woman with no significant past medical history.  Patient presents for the evaluation of a prolonged PTT in the setting of a recent pregnancy complicated by proteinuria, pre-eclampsia, and HELLP syndrome.  CHild was delivered by C section on 11/27.  PTT 32.5 sec on admission and platelets normal at 156.  DUring the course of  the hospital stay, plts fell to a ebony of 33, and PTT carlos alberto to peak of 75 sec on November 30th, before improving by time of discharge on 12/2/20 with aPTT 32.1 sec and platelets 81,000.  Platelets returned to normal post discharge on December 10th 373, but PTT carlos alberto again to 39 seconds and remained 36 seconds as of February 3rd.  Patient with no During this time period at the hospital, patient does not note any excessive bleeding or bruising. C section incision was well healed.  She did however notice intermittent vaginal spotting which continues on and off even this past week.    She was also noticed to have persistent proteinuria which is still evident by frothing of her urine even now.  \par Vaginal Bleeding occurred for a few hours, then stop, then bleeding again a day later.  During 6 week post partum, it was noted that that because she was not breast feeding, cycle could come back sooner.  \par \par Given pronged proteinuria following pre-eclampsia, they are considering a renal biopsy.  Does have bloodwork and follow with nephrology on March 15th during which she will commit to the decision for the biopsy.

## 2021-03-18 NOTE — ASSESSMENT
[FreeTextEntry1] : This is a 30 year old woman with no past medical history until her recent delivery by C section. Hospital course complicated by proteinuria, HELLP syndrome, Consumptive coagulopathy, Pre-eclampsia.  PTT was as high as 57 sec on 11/30, platelets as low as 33,000.  No active bleeding noted, though she has had vaginal spotting for longer than expected.  Platelets have since returned to normal for the past 2 months, however PTT remains prolonged from 36-39 seconds.  There are 2 possible explanations for this. Ritu had a positive DRVVT during the hospital stay, this can ac as an inhibitor to the intrinsic pathway prolonging PTT. Will re-run DRVVT along with silica clotting time and anticardiolipin and anti beta 2 glycoprotein antibodies.  SEcond she may have a directed factor deficiency from the proteinuria which persisted after delivery for more than 2 months.  Evaluate for VWF Activity and antigen, multimer study, Factors  VIII, IX, XI,XII and fibrogen.  Repeat PTT with mixing study.  \par \par Addendum 3/1/21\par Bloodwork returned, PTT 36.3 seconds still modestly prolonged.   It does correct with 50/50 mix and on 2 hour incubation.  No inhibitor. VWF Activity, 78% Antigen 98% factor VIII 97%, no evidence of von willibrand's disease.  Factor Factor % Factor %, Factor XII activity 75%.  All within normal range.  \par DRVVT, silica clotting time, Anticardiolipin and anti B2 glycoprotein antibodies all normal, no evidence of lupus anticoagulant.  \par \par No coagulopathy tested on directed factor assays.  Prolonged PTT still noted, but this does not comprise a bleeding disorder. \par \par Called patient to discuss results, however no answer, left a VM. \par \par Addendum 3/18/21.  No detectable bleeding disorder. Patient is hematologically cleared for renal biopsy.

## 2021-03-23 LAB
ALBUMIN SERPL ELPH-MCNC: 4.2 G/DL
ANION GAP SERPL CALC-SCNC: 14 MMOL/L
APPEARANCE: CLEAR
APTT BLD: 35.8 SEC
BACTERIA UR CULT: NORMAL
BACTERIA: NEGATIVE
BASOPHILS # BLD AUTO: 0.02 K/UL
BASOPHILS NFR BLD AUTO: 0.3 %
BILIRUBIN URINE: NEGATIVE
BLOOD URINE: NEGATIVE
BUN SERPL-MCNC: 12 MG/DL
CALCIUM OXALATE CRYSTALS: ABNORMAL
CALCIUM SERPL-MCNC: 9.5 MG/DL
CHLORIDE SERPL-SCNC: 104 MMOL/L
CO2 SERPL-SCNC: 24 MMOL/L
COLOR: YELLOW
CREAT SERPL-MCNC: 0.64 MG/DL
CREAT SPEC-SCNC: 131 MG/DL
CREAT/PROT UR: 0.4 RATIO
EOSINOPHIL # BLD AUTO: 0.06 K/UL
EOSINOPHIL NFR BLD AUTO: 0.9 %
FERRITIN SERPL-MCNC: 92 NG/ML
GLUCOSE QUALITATIVE U: NEGATIVE
GLUCOSE SERPL-MCNC: 100 MG/DL
HCT VFR BLD CALC: 42.4 %
HGB BLD-MCNC: 13.2 G/DL
HYALINE CASTS: 0 /LPF
IMM GRANULOCYTES NFR BLD AUTO: 0.3 %
INR PPP: 0.93 RATIO
IRON SATN MFR SERPL: 35 %
IRON SERPL-MCNC: 119 UG/DL
KETONES URINE: NEGATIVE
LEUKOCYTE ESTERASE URINE: NEGATIVE
LYMPHOCYTES # BLD AUTO: 2.64 K/UL
LYMPHOCYTES NFR BLD AUTO: 41.6 %
MAN DIFF?: NORMAL
MCHC RBC-ENTMCNC: 29.4 PG
MCHC RBC-ENTMCNC: 31.1 GM/DL
MCV RBC AUTO: 94.4 FL
MICROSCOPIC-UA: NORMAL
MONOCYTES # BLD AUTO: 0.48 K/UL
MONOCYTES NFR BLD AUTO: 7.6 %
NEUTROPHILS # BLD AUTO: 3.12 K/UL
NEUTROPHILS NFR BLD AUTO: 49.3 %
NITRITE URINE: NEGATIVE
PH URINE: 6
PHOSPHATE SERPL-MCNC: 4.3 MG/DL
PLATELET # BLD AUTO: 229 K/UL
POTASSIUM SERPL-SCNC: 4.2 MMOL/L
PROT UR-MCNC: 57 MG/DL
PROTEIN URINE: ABNORMAL
PT BLD: 11.1 SEC
RBC # BLD: 4.49 M/UL
RBC # FLD: 12.4 %
RED BLOOD CELLS URINE: 2 /HPF
SODIUM SERPL-SCNC: 142 MMOL/L
SPECIFIC GRAVITY URINE: 1.03
SQUAMOUS EPITHELIAL CELLS: 3 /HPF
TIBC SERPL-MCNC: 336 UG/DL
UIBC SERPL-MCNC: 217 UG/DL
UROBILINOGEN URINE: NORMAL
WBC # FLD AUTO: 6.34 K/UL
WHITE BLOOD CELLS URINE: 2 /HPF

## 2021-04-06 ENCOUNTER — OUTPATIENT (OUTPATIENT)
Dept: OUTPATIENT SERVICES | Facility: HOSPITAL | Age: 31
LOS: 1 days | End: 2021-04-06
Payer: COMMERCIAL

## 2021-04-06 DIAGNOSIS — Z11.52 ENCOUNTER FOR SCREENING FOR COVID-19: ICD-10-CM

## 2021-04-06 DIAGNOSIS — Z98.890 OTHER SPECIFIED POSTPROCEDURAL STATES: Chronic | ICD-10-CM

## 2021-04-06 DIAGNOSIS — O03.9 COMPLETE OR UNSPECIFIED SPONTANEOUS ABORTION WITHOUT COMPLICATION: Chronic | ICD-10-CM

## 2021-04-06 LAB — SARS-COV-2 RNA SPEC QL NAA+PROBE: SIGNIFICANT CHANGE UP

## 2021-04-06 PROCEDURE — U0005: CPT

## 2021-04-06 PROCEDURE — U0003: CPT

## 2021-04-06 PROCEDURE — C9803: CPT

## 2021-04-09 ENCOUNTER — OUTPATIENT (OUTPATIENT)
Dept: OUTPATIENT SERVICES | Facility: HOSPITAL | Age: 31
LOS: 1 days | End: 2021-04-09
Payer: COMMERCIAL

## 2021-04-09 ENCOUNTER — RESULT REVIEW (OUTPATIENT)
Age: 31
End: 2021-04-09

## 2021-04-09 ENCOUNTER — APPOINTMENT (OUTPATIENT)
Dept: ULTRASOUND IMAGING | Facility: HOSPITAL | Age: 31
End: 2021-04-09
Payer: COMMERCIAL

## 2021-04-09 DIAGNOSIS — Z00.00 ENCOUNTER FOR GENERAL ADULT MEDICAL EXAMINATION WITHOUT ABNORMAL FINDINGS: ICD-10-CM

## 2021-04-09 DIAGNOSIS — O03.9 COMPLETE OR UNSPECIFIED SPONTANEOUS ABORTION WITHOUT COMPLICATION: Chronic | ICD-10-CM

## 2021-04-09 DIAGNOSIS — R80.9 PROTEINURIA, UNSPECIFIED: ICD-10-CM

## 2021-04-09 DIAGNOSIS — Z98.890 OTHER SPECIFIED POSTPROCEDURAL STATES: Chronic | ICD-10-CM

## 2021-04-09 PROCEDURE — 50200 RENAL BIOPSY PERQ: CPT

## 2021-04-09 PROCEDURE — 88313 SPECIAL STAINS GROUP 2: CPT | Mod: 26

## 2021-04-09 PROCEDURE — 88348 ELECTRON MICROSCOPY DX: CPT

## 2021-04-09 PROCEDURE — 50200 RENAL BIOPSY PERQ: CPT | Mod: LT

## 2021-04-09 PROCEDURE — 88346 IMFLUOR 1ST 1ANTB STAIN PX: CPT

## 2021-04-09 PROCEDURE — 88350 IMFLUOR EA ADDL 1ANTB STN PX: CPT

## 2021-04-09 PROCEDURE — 76942 ECHO GUIDE FOR BIOPSY: CPT

## 2021-04-09 PROCEDURE — 88313 SPECIAL STAINS GROUP 2: CPT

## 2021-04-09 PROCEDURE — 76942 ECHO GUIDE FOR BIOPSY: CPT | Mod: 26

## 2021-04-09 PROCEDURE — 88312 SPECIAL STAINS GROUP 1: CPT | Mod: 26

## 2021-04-09 PROCEDURE — 88350 IMFLUOR EA ADDL 1ANTB STN PX: CPT | Mod: 26

## 2021-04-09 PROCEDURE — 88312 SPECIAL STAINS GROUP 1: CPT

## 2021-04-09 PROCEDURE — 88348 ELECTRON MICROSCOPY DX: CPT | Mod: 26

## 2021-04-09 PROCEDURE — 88305 TISSUE EXAM BY PATHOLOGIST: CPT | Mod: 26

## 2021-04-09 PROCEDURE — 88305 TISSUE EXAM BY PATHOLOGIST: CPT

## 2021-04-09 PROCEDURE — 88346 IMFLUOR 1ST 1ANTB STAIN PX: CPT | Mod: 26

## 2021-04-15 LAB — SURGICAL PATHOLOGY STUDY: SIGNIFICANT CHANGE UP

## 2021-04-22 ENCOUNTER — NON-APPOINTMENT (OUTPATIENT)
Age: 31
End: 2021-04-22

## 2021-05-25 LAB
ALBUMIN SERPL ELPH-MCNC: 4.8 G/DL
ANION GAP SERPL CALC-SCNC: 17 MMOL/L
APPEARANCE: CLEAR
BACTERIA: ABNORMAL
BILIRUBIN URINE: NEGATIVE
BLOOD URINE: NORMAL
BUN SERPL-MCNC: 10 MG/DL
CALCIUM SERPL-MCNC: 9.8 MG/DL
CHLORIDE SERPL-SCNC: 101 MMOL/L
CO2 SERPL-SCNC: 22 MMOL/L
COLOR: NORMAL
CREAT SERPL-MCNC: 0.65 MG/DL
CREAT SPEC-SCNC: 49 MG/DL
CREAT/PROT UR: 0.3 RATIO
GLUCOSE QUALITATIVE U: NEGATIVE
GLUCOSE SERPL-MCNC: 85 MG/DL
HYALINE CASTS: 3 /LPF
KETONES URINE: NEGATIVE
LEUKOCYTE ESTERASE URINE: NEGATIVE
MICROSCOPIC-UA: NORMAL
NITRITE URINE: NEGATIVE
PH URINE: 6
PHOSPHATE SERPL-MCNC: 4.1 MG/DL
POTASSIUM SERPL-SCNC: 3.9 MMOL/L
PROT UR-MCNC: 17 MG/DL
PROTEIN URINE: NORMAL
RED BLOOD CELLS URINE: 3 /HPF
SODIUM SERPL-SCNC: 140 MMOL/L
SPECIFIC GRAVITY URINE: 1.01
SQUAMOUS EPITHELIAL CELLS: 4 /HPF
UROBILINOGEN URINE: NORMAL
WHITE BLOOD CELLS URINE: 3 /HPF

## 2021-05-26 ENCOUNTER — RX RENEWAL (OUTPATIENT)
Age: 31
End: 2021-05-26

## 2021-06-04 ENCOUNTER — APPOINTMENT (OUTPATIENT)
Dept: CV DIAGNOSITCS | Facility: HOSPITAL | Age: 31
End: 2021-06-04

## 2021-06-04 ENCOUNTER — OUTPATIENT (OUTPATIENT)
Dept: OUTPATIENT SERVICES | Facility: HOSPITAL | Age: 31
LOS: 1 days | End: 2021-06-04
Payer: COMMERCIAL

## 2021-06-04 DIAGNOSIS — O03.9 COMPLETE OR UNSPECIFIED SPONTANEOUS ABORTION WITHOUT COMPLICATION: Chronic | ICD-10-CM

## 2021-06-04 DIAGNOSIS — O14.93 UNSPECIFIED PRE-ECLAMPSIA, THIRD TRIMESTER: ICD-10-CM

## 2021-06-04 DIAGNOSIS — Z98.890 OTHER SPECIFIED POSTPROCEDURAL STATES: Chronic | ICD-10-CM

## 2021-06-04 PROCEDURE — 93306 TTE W/DOPPLER COMPLETE: CPT | Mod: 26

## 2021-06-04 PROCEDURE — 93306 TTE W/DOPPLER COMPLETE: CPT

## 2021-07-12 NOTE — PATIENT PROFILE ADULT - DO YOU FEEL LIKE HURTING YOURSELF OR OTHERS?
Worsening.  Add spironolactone 25 mg once a day.  Stop potassium.  We will do a blood pressure log and when returns it he will tell us if the swelling is better or not   no

## 2021-08-18 ENCOUNTER — APPOINTMENT (OUTPATIENT)
Dept: NEPHROLOGY | Facility: CLINIC | Age: 31
End: 2021-08-18
Payer: COMMERCIAL

## 2021-08-18 VITALS
HEIGHT: 62 IN | HEART RATE: 74 BPM | BODY MASS INDEX: 35.7 KG/M2 | SYSTOLIC BLOOD PRESSURE: 119 MMHG | TEMPERATURE: 97.34 F | OXYGEN SATURATION: 99 % | DIASTOLIC BLOOD PRESSURE: 80 MMHG | WEIGHT: 194 LBS

## 2021-08-18 DIAGNOSIS — R80.9 PROTEINURIA, UNSPECIFIED: ICD-10-CM

## 2021-08-18 PROCEDURE — 99214 OFFICE O/P EST MOD 30 MIN: CPT | Mod: GC

## 2021-08-18 RX ORDER — NIFEDIPINE 30 MG/1
30 TABLET, FILM COATED, EXTENDED RELEASE ORAL
Qty: 90 | Refills: 2 | Status: DISCONTINUED | COMMUNITY
Start: 2021-05-26 | End: 2021-08-18

## 2021-08-18 NOTE — HISTORY OF PRESENT ILLNESS
[FreeTextEntry1] : 30 year old female with past medical history high blood pressure and PEC at 35 weeks and 3 days. She had an emergent C section due to blood pressure/PEC/HELLPS. Had nephrotic range proteinuria prot/cr 5.1g nov 30\par \par Currently she is 9 months post partum, not breastfeeding\par Since her last visit she has seen Cardiology and she had a neg stress test per pt.\par She had a renal biopsy that showed IgA nephropathy and was started on losartan 25mg qD by us April. She reports sometimes she skips Losartan doses- maybe 5x/month; \par She infrequently checks her BP- maybe 2-3x/month and BP at home ranges from 118-120/78-82\par \par Feels well today. She denies SOB, leg swelling, N/V, HA, blurry vision, abd pain. Other ROS negative\par COVID #2 was in May (Moderna)\par

## 2021-08-18 NOTE — OB RN INTRAOPERATIVE NOTE - NS_ANESTHESIATYPE_OBGYN_ALL_OB
Epidural [Disease: _____________________] : Disease: [unfilled] [AJCC Stage: ____] : AJCC Stage: [unfilled] [de-identified] : Age 69: uterine cancer and breast cancer concomitantly diagnosed\par She had been having PMB for 1 1/2 years and had work up including pelvic sonogram which showed 6.6 cm mass with increased vascularity in the uterus. She had MRI of the pelvis which showed 6.3 heterogeneously enhancing mass in the endometrium. 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 She had CT of the chest/ abd/ pelvis performed at [facility] on 5/14/2021 which showed bilateral pulmonary nodules subcentimeter, enlarged lymph nodes in the aortocaval space with large right pelvic sidewall lymph nodes and endometrial mass. Meanwhile, she had screening mammogram performed on 5/5/2021 which showed irregular nodule over the upper outer quadrant of the left breast. U/s measured breast mass on 5/9/2021 to be 2.4 cm x 7 mm. 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 She was referred for neoadjuvant chemotherapy. She tolerated 3 cycles of chemotherapy and had interval CT of the chest/ abd/ pelvis done at [facility] which showed decreased uterine cavity and adenopathy; stable pulmonary nodules.  [de-identified] : breast cancer: [facility] ER negative, MO negative, [id number] positive; uterine cancer: high grade serous carcinoma  [de-identified] : [facility]: weekly paclitaxel 6/30/2021 to present  [de-identified] : She has been having mouth sore which has affected eating and slowly feeling better off chemotherapy for 3 weeks but afraid of sore worsening again with the chemotherapy. She has been using the magic mouthwash every 4 hours. She has been having stomach pain: feels there is an ulcer. She has been throwing up after eating. She currently is feeling well and wants conservative treatment: does not want any chemotherapy or surgery. Wants to know if there is a pill medication to help with her cancer without further chemotherapy. She has been having skin itching and has been applying moisturizer daily and feeling the itching is improved off the chemotherapy.

## 2021-08-18 NOTE — ASSESSMENT
[FreeTextEntry1] : 30 year old female delivered for suspected superimposed PEC on Nephrotic Syndrome, HTN, PEC, HELLP syndrome, s/p renal biopsy post partum and found to have biopsy proven IgA nephropathy\par  \par #CKD Stage 1- Biopsy proven IgA nephropathy- \par -Started on losartan 25 qD \par -advised compliance with Losartan\par -check renal panel and UA, prot/cr\par -candidate for SGLT2\par \par #proteinuria- monitor today\par \par #HTN management- off nifedipine to 90mg daily; On losartan 25 qD; monitor BP trend at home; BP stable in office. BP at home ranges from 118-120/78-82 st home. \par \par #advised to stop Motrin- she only took two days/two doses\par \par #Future Pregnancy Counseling\par -Discussed with patient that if pregnancy is contemplated in the future it should be planned\par -She can not get pregnant on Losartan\par -given PE/HELLPS superimposed on her Renal disease- high chance of kidney disease worsening, proteinuria worsening as well as PEC again; She understands\par \par \par \par \par

## 2021-08-18 NOTE — PHYSICAL EXAM
[General Appearance - Alert] : alert [General Appearance - In No Acute Distress] : in no acute distress [General Appearance - Well Nourished] : well nourished [General Appearance - Well Developed] : well developed [Sclera] : the sclera and conjunctiva were normal [PERRL With Normal Accommodation] : pupils were equal in size, round, and reactive to light [Extraocular Movements] : extraocular movements were intact [Neck Appearance] : the appearance of the neck was normal [Neck Cervical Mass (___cm)] : no neck mass was observed [Jugular Venous Distention Increased] : there was no jugular-venous distention [] : no respiratory distress [Respiration, Rhythm And Depth] : normal respiratory rhythm and effort [Exaggerated Use Of Accessory Muscles For Inspiration] : no accessory muscle use [Auscultation Breath Sounds / Voice Sounds] : lungs were clear to auscultation bilaterally [Apical Impulse] : the apical impulse was normal [Heart Rate And Rhythm] : heart rate was normal and rhythm regular [Heart Sounds] : normal S1 and S2 [Heart Sounds Gallop] : no gallops [Murmurs] : no murmurs [Heart Sounds Pericardial Friction Rub] : no pericardial rub [Edema] : there was no peripheral edema [Bowel Sounds] : normal bowel sounds [Abdomen Tenderness] : non-tender [Abdomen Soft] : soft [No CVA Tenderness] : no ~M costovertebral angle tenderness [No Spinal Tenderness] : no spinal tenderness [Abnormal Walk] : normal gait [No Focal Deficits] : no focal deficits [Oriented To Time, Place, And Person] : oriented to person, place, and time [Impaired Insight] : insight and judgment were intact [Affect] : the affect was normal [Mood] : the mood was normal

## 2021-08-19 LAB
25(OH)D3 SERPL-MCNC: 20.3 NG/ML
ALBUMIN SERPL ELPH-MCNC: 4.6 G/DL
ANION GAP SERPL CALC-SCNC: 11 MMOL/L
BASOPHILS # BLD AUTO: 0.03 K/UL
BASOPHILS NFR BLD AUTO: 0.5 %
BUN SERPL-MCNC: 12 MG/DL
CALCIUM SERPL-MCNC: 9.6 MG/DL
CHLORIDE SERPL-SCNC: 106 MMOL/L
CO2 SERPL-SCNC: 23 MMOL/L
CREAT SERPL-MCNC: 0.88 MG/DL
EOSINOPHIL # BLD AUTO: 0.08 K/UL
EOSINOPHIL NFR BLD AUTO: 1.3 %
FERRITIN SERPL-MCNC: 122 NG/ML
GLUCOSE SERPL-MCNC: 96 MG/DL
HCT VFR BLD CALC: 42.5 %
HGB BLD-MCNC: 13.4 G/DL
IMM GRANULOCYTES NFR BLD AUTO: 0.3 %
IRON SATN MFR SERPL: 38 %
IRON SERPL-MCNC: 128 UG/DL
LYMPHOCYTES # BLD AUTO: 2.44 K/UL
LYMPHOCYTES NFR BLD AUTO: 38.2 %
MAN DIFF?: NORMAL
MCHC RBC-ENTMCNC: 29.2 PG
MCHC RBC-ENTMCNC: 31.5 GM/DL
MCV RBC AUTO: 92.6 FL
MONOCYTES # BLD AUTO: 0.38 K/UL
MONOCYTES NFR BLD AUTO: 5.9 %
NEUTROPHILS # BLD AUTO: 3.44 K/UL
NEUTROPHILS NFR BLD AUTO: 53.8 %
PHOSPHATE SERPL-MCNC: 3.4 MG/DL
PLATELET # BLD AUTO: 224 K/UL
POTASSIUM SERPL-SCNC: 4 MMOL/L
RBC # BLD: 4.59 M/UL
RBC # FLD: 12.7 %
SODIUM SERPL-SCNC: 141 MMOL/L
TIBC SERPL-MCNC: 340 UG/DL
UIBC SERPL-MCNC: 212 UG/DL
WBC # FLD AUTO: 6.39 K/UL

## 2021-09-10 ENCOUNTER — NON-APPOINTMENT (OUTPATIENT)
Age: 31
End: 2021-09-10

## 2021-09-10 LAB
APPEARANCE: CLEAR
BACTERIA: NEGATIVE
BILIRUBIN URINE: NEGATIVE
BLOOD URINE: NORMAL
COLOR: NORMAL
CREAT SPEC-SCNC: 58 MG/DL
CREAT/PROT UR: 0.2 RATIO
GLUCOSE QUALITATIVE U: ABNORMAL
HYALINE CASTS: 0 /LPF
KETONES URINE: NEGATIVE
LEUKOCYTE ESTERASE URINE: NEGATIVE
MICROSCOPIC-UA: NORMAL
NITRITE URINE: NEGATIVE
PH URINE: 6
PROT UR-MCNC: 13 MG/DL
PROTEIN URINE: NORMAL
RED BLOOD CELLS URINE: 2 /HPF
SPECIFIC GRAVITY URINE: 1.01
SQUAMOUS EPITHELIAL CELLS: 1 /HPF
UROBILINOGEN URINE: NORMAL
WHITE BLOOD CELLS URINE: 1 /HPF

## 2021-11-24 ENCOUNTER — APPOINTMENT (OUTPATIENT)
Dept: NEPHROLOGY | Facility: CLINIC | Age: 31
End: 2021-11-24

## 2022-02-16 ENCOUNTER — APPOINTMENT (OUTPATIENT)
Dept: MAMMOGRAPHY | Facility: CLINIC | Age: 32
End: 2022-02-16
Payer: COMMERCIAL

## 2022-02-16 ENCOUNTER — APPOINTMENT (OUTPATIENT)
Dept: ULTRASOUND IMAGING | Facility: CLINIC | Age: 32
End: 2022-02-16
Payer: COMMERCIAL

## 2022-02-16 PROCEDURE — G0279: CPT

## 2022-02-16 PROCEDURE — 76641 ULTRASOUND BREAST COMPLETE: CPT | Mod: RT

## 2022-02-16 PROCEDURE — 77066 DX MAMMO INCL CAD BI: CPT

## 2022-05-27 ENCOUNTER — RX RENEWAL (OUTPATIENT)
Age: 32
End: 2022-05-27

## 2022-08-16 ENCOUNTER — APPOINTMENT (OUTPATIENT)
Dept: ULTRASOUND IMAGING | Facility: CLINIC | Age: 32
End: 2022-08-16

## 2022-09-14 ENCOUNTER — APPOINTMENT (OUTPATIENT)
Dept: NEPHROLOGY | Facility: CLINIC | Age: 32
End: 2022-09-14

## 2022-10-31 ENCOUNTER — RX RENEWAL (OUTPATIENT)
Age: 32
End: 2022-10-31

## 2022-11-03 ENCOUNTER — RX RENEWAL (OUTPATIENT)
Age: 32
End: 2022-11-03

## 2022-11-03 RX ORDER — LOSARTAN POTASSIUM 25 MG/1
25 TABLET, FILM COATED ORAL
Qty: 90 | Refills: 0 | Status: ACTIVE | COMMUNITY
Start: 2021-04-22 | End: 1900-01-01

## 2023-01-04 ENCOUNTER — APPOINTMENT (OUTPATIENT)
Dept: NEPHROLOGY | Facility: CLINIC | Age: 33
End: 2023-01-04
Payer: COMMERCIAL

## 2023-01-04 VITALS
HEART RATE: 70 BPM | WEIGHT: 198 LBS | TEMPERATURE: 206.24 F | BODY MASS INDEX: 36.44 KG/M2 | HEIGHT: 62 IN | OXYGEN SATURATION: 97 % | SYSTOLIC BLOOD PRESSURE: 101 MMHG | DIASTOLIC BLOOD PRESSURE: 51 MMHG

## 2023-01-04 DIAGNOSIS — N02.8 RECURRENT AND PERSISTENT HEMATURIA WITH OTHER MORPHOLOGIC CHANGES: ICD-10-CM

## 2023-01-04 DIAGNOSIS — N18.1 CHRONIC KIDNEY DISEASE, STAGE 1: ICD-10-CM

## 2023-01-04 DIAGNOSIS — I10 ESSENTIAL (PRIMARY) HYPERTENSION: ICD-10-CM

## 2023-01-04 PROCEDURE — 99214 OFFICE O/P EST MOD 30 MIN: CPT

## 2023-01-04 NOTE — HISTORY OF PRESENT ILLNESS
[FreeTextEntry1] : Pt was last here in 2021\par She has not seen PMD since and has not had labs or urine checked\par Has appnt with DR Joanie Singh Jan 16th who will be her new family [facility] doctor\par She has seen a dermatologist and she reports she was started on  Acne treatment since Sept- SLN 100mg daily-- stopped taking it after a month and a half; now off; She plans on restarting tomorrow \par and was on Doxy 2.5 weeks; now has script for Minocycline 100mg daily\par \par She reports she is more compliant with Losrtan 25mg daily; skips 2x/month \par \par Does not check BP at home\par \par Got COVID illness 2022 Jan\par few weeks ago had cold 1.5 week ago- congestion, cough dry; covid neg @ home; she reports prob getting t at office party where many got sick after with covid\par covid booster May 2022\par \par Denies leg swelling\par Unsure foamy urine/bubbly urine\par No gross hematuria\par Other ROS neg\par \par \par \par  \par

## 2023-01-04 NOTE — ASSESSMENT
[FreeTextEntry1] : 32 year old female delivered for suspected superimposed PEC on Nephrotic Syndrome 2 years ago, HTN, PEC, HELLP syndrome, s/p renal biopsy post partum and found to have biopsy proven IgA nephropathy\par  \par #CKD Stage 1- Biopsy proven IgA nephropathy- \par -Started on losartan 25 qD \par -advised compliance with Losartan, which she is better with\par -check renal panel and UA, prot/cr\par -candidate for SGLT2i; d/w pt\par \par #proteinuria- check ua, prot/cr today\par \par #HTN management- off nifedipine to 90mg daily; On losartan 25 qD; monitor BP trend at home; BP stable in office. \par \par #advised to stop Motrin- she only took two days/two doses\par \par #Future Pregnancy Counseling\par -Discussed with patient that if pregnancy is contemplated in the future it should be planned\par -She can not get pregnant on Losartan, Spironolactone, Doxy or Minocycline\par -given PEC/HELLPS superimposed on her Renal disease- high chance of kidney disease worsening, proteinuria worsening as well as PEC again; She understands she is high risk \par -will have to plan pregnancy and conception with MFM; she reports she will wait at least 4 years\par -she wants to get IUD \par \par \par \par \par \par \par accutane\par \par sln\par

## 2023-01-05 LAB
25(OH)D3 SERPL-MCNC: 24.1 NG/ML
ALBUMIN SERPL ELPH-MCNC: 4.6 G/DL
ANION GAP SERPL CALC-SCNC: 13 MMOL/L
APPEARANCE: CLEAR
BACTERIA: NEGATIVE
BASOPHILS # BLD AUTO: 0.05 K/UL
BASOPHILS NFR BLD AUTO: 0.8 %
BILIRUBIN URINE: NEGATIVE
BLOOD URINE: NEGATIVE
BUN SERPL-MCNC: 10 MG/DL
CALCIUM SERPL-MCNC: 10.3 MG/DL
CHLORIDE SERPL-SCNC: 102 MMOL/L
CO2 SERPL-SCNC: 26 MMOL/L
COLOR: NORMAL
CREAT SERPL-MCNC: 0.85 MG/DL
CREAT SPEC-SCNC: 75 MG/DL
CREAT/PROT UR: 0.1 RATIO
EGFR: 93 ML/MIN/1.73M2
EOSINOPHIL # BLD AUTO: 0.08 K/UL
EOSINOPHIL NFR BLD AUTO: 1.3 %
ESTIMATED AVERAGE GLUCOSE: 120 MG/DL
FERRITIN SERPL-MCNC: 120 NG/ML
GLUCOSE QUALITATIVE U: NEGATIVE
GLUCOSE SERPL-MCNC: 89 MG/DL
HBA1C MFR BLD HPLC: 5.8 %
HCT VFR BLD CALC: 44.8 %
HGB BLD-MCNC: 14.3 G/DL
HYALINE CASTS: 0 /LPF
IMM GRANULOCYTES NFR BLD AUTO: 0.2 %
IRON SATN MFR SERPL: 16 %
IRON SERPL-MCNC: 65 UG/DL
KETONES URINE: NEGATIVE
LEUKOCYTE ESTERASE URINE: NEGATIVE
LYMPHOCYTES # BLD AUTO: 2.37 K/UL
LYMPHOCYTES NFR BLD AUTO: 37.1 %
MAN DIFF?: NORMAL
MCHC RBC-ENTMCNC: 30.3 PG
MCHC RBC-ENTMCNC: 31.9 GM/DL
MCV RBC AUTO: 94.9 FL
MICROSCOPIC-UA: NORMAL
MONOCYTES # BLD AUTO: 0.62 K/UL
MONOCYTES NFR BLD AUTO: 9.7 %
NEUTROPHILS # BLD AUTO: 3.25 K/UL
NEUTROPHILS NFR BLD AUTO: 50.9 %
NITRITE URINE: NEGATIVE
PH URINE: 7
PHOSPHATE SERPL-MCNC: 3.9 MG/DL
PLATELET # BLD AUTO: 261 K/UL
POTASSIUM SERPL-SCNC: 4.3 MMOL/L
PROT UR-MCNC: 10 MG/DL
PROTEIN URINE: NORMAL
RBC # BLD: 4.72 M/UL
RBC # FLD: 13.7 %
RED BLOOD CELLS URINE: 3 /HPF
SODIUM SERPL-SCNC: 141 MMOL/L
SPECIFIC GRAVITY URINE: 1.01
SQUAMOUS EPITHELIAL CELLS: 1 /HPF
TIBC SERPL-MCNC: 416 UG/DL
UIBC SERPL-MCNC: 351 UG/DL
UROBILINOGEN URINE: NORMAL
WBC # FLD AUTO: 6.38 K/UL
WHITE BLOOD CELLS URINE: 1 /HPF

## 2024-02-09 ENCOUNTER — APPOINTMENT (OUTPATIENT)
Dept: OBGYN | Facility: CLINIC | Age: 34
End: 2024-02-09
Payer: COMMERCIAL

## 2024-02-09 PROCEDURE — 76830 TRANSVAGINAL US NON-OB: CPT

## 2024-02-09 PROCEDURE — 99385 PREV VISIT NEW AGE 18-39: CPT | Mod: 25

## 2024-05-09 NOTE — OB NEONATOLOGY/PEDIATRICIAN DELIVERY SUMMARY - BABY A: APGAR 5 MIN HEART RATE, DELIVERY
sob/ [facility] was told to f/u with PMD and d/c with meds/ diagnosed with URI
(2) more than 100 beats/min
room air

## 2024-08-27 ENCOUNTER — APPOINTMENT (OUTPATIENT)
Dept: NEPHROLOGY | Facility: CLINIC | Age: 34
End: 2024-08-27
Payer: COMMERCIAL

## 2024-08-27 VITALS
HEART RATE: 68 BPM | HEIGHT: 62 IN | WEIGHT: 203.92 LBS | TEMPERATURE: 207.32 F | DIASTOLIC BLOOD PRESSURE: 94 MMHG | BODY MASS INDEX: 37.53 KG/M2 | SYSTOLIC BLOOD PRESSURE: 141 MMHG | OXYGEN SATURATION: 98 %

## 2024-08-27 VITALS — SYSTOLIC BLOOD PRESSURE: 130 MMHG | DIASTOLIC BLOOD PRESSURE: 86 MMHG

## 2024-08-27 DIAGNOSIS — N02.B9 OTHER RECURRENT AND PERSISTENT IMMUNOGLOBULIN A NEPHROPATHY: ICD-10-CM

## 2024-08-27 DIAGNOSIS — N18.1 CHRONIC KIDNEY DISEASE, STAGE 1: ICD-10-CM

## 2024-08-27 DIAGNOSIS — I10 ESSENTIAL (PRIMARY) HYPERTENSION: ICD-10-CM

## 2024-08-27 PROCEDURE — 99214 OFFICE O/P EST MOD 30 MIN: CPT

## 2024-08-27 NOTE — HISTORY OF PRESENT ILLNESS
[FreeTextEntry1] : Pt here for follow up for IGAN  Pt last appnt was Jan 2023 Pt stated that she was very on/off with Losartan bc of her busy schedule.  Stated that she got sick end of July.  Had sore throat in August with cough until this past weekend. Was told she had Hypertension/ BP was high at work last week. This was a routine eval for her persistent cough at work Pt stated she restarted Losartan last week. Pt saw Dr Singh in January.   She does not BP at home.   Denies leg swelling Unsure foamy urine/bubbly urine No gross hematuria No burning or pain with urination Other ROS neg  Meds:  Losartan 25mg daily;  Escitaloprim  NO NSAIDS Denies herbal or OTC

## 2024-08-27 NOTE — PHYSICAL EXAM
[General Appearance - Alert] : alert [General Appearance - In No Acute Distress] : in no acute distress [General Appearance - Well Nourished] : well nourished [General Appearance - Well Developed] : well developed [General Appearance - Well-Appearing] : healthy appearing [Sclera] : the sclera and conjunctiva were normal [Outer Ear] : the ears and nose were normal in appearance [Neck Appearance] : the appearance of the neck was normal [] : no respiratory distress [Respiration, Rhythm And Depth] : normal respiratory rhythm and effort [Auscultation Breath Sounds / Voice Sounds] : lungs were clear to auscultation bilaterally [Apical Impulse] : the apical impulse was normal [Heart Rate And Rhythm] : heart rate was normal and rhythm regular [Heart Sounds] : normal S1 and S2 [Bowel Sounds] : normal bowel sounds [No CVA Tenderness] : no ~M costovertebral angle tenderness [Abnormal Walk] : normal gait [Skin Color & Pigmentation] : normal skin color and pigmentation [No Focal Deficits] : no focal deficits [Oriented To Time, Place, And Person] : oriented to person, place, and time [Impaired Insight] : insight and judgment were intact [Affect] : the affect was normal [Edema] : there was no peripheral edema

## 2024-08-27 NOTE — ASSESSMENT
[FreeTextEntry1] : 33 year old female delivered for suspected superimposed PEC on Nephrotic Syndrome 2 years ago, HTN, PEC, HELLP syndrome, s/p renal biopsy post partum and found to have biopsy proven IgA nephropathy   #CKD Stage 1- Biopsy proven IgA nephropathy- -Started on losartan 25 qD, not compliant -check renal panel and UA, prot/cr - possible candidate for SGLT2i once ARB is maxed d/w pt  #proteinuria- check ua, prot/cr today  #HTN management- off nifedipine to 90mg daily; On losartan 25 qD; monitor BP trend at home; BP stable in office. -advised compliance with Losartan, which she is better with for the past week since she was told her BP was elevated -advised to check BP at home and let me know if elevated -counseled on low salt diet   #Future Pregnancy Counseling -has IUD

## 2024-08-28 LAB
25(OH)D3 SERPL-MCNC: 24.5 NG/ML
ALBUMIN SERPL ELPH-MCNC: 4.6 G/DL
ANION GAP SERPL CALC-SCNC: 12 MMOL/L
APPEARANCE: CLEAR
BACTERIA: NEGATIVE /HPF
BILIRUBIN URINE: NEGATIVE
BLOOD URINE: ABNORMAL
BUN SERPL-MCNC: 11 MG/DL
CALCIUM SERPL-MCNC: 9.7 MG/DL
CAST: 1 /LPF
CHLORIDE SERPL-SCNC: 103 MMOL/L
CO2 SERPL-SCNC: 25 MMOL/L
COLOR: YELLOW
CREAT SERPL-MCNC: 0.8 MG/DL
CREAT SPEC-SCNC: 242 MG/DL
CREAT/PROT UR: 0.1 RATIO
EGFR: 100 ML/MIN/1.73M2
EPITHELIAL CELLS: 9 /HPF
ESTIMATED AVERAGE GLUCOSE: 123 MG/DL
FERRITIN SERPL-MCNC: 215 NG/ML
GLUCOSE QUALITATIVE U: NEGATIVE MG/DL
GLUCOSE SERPL-MCNC: 100 MG/DL
HBA1C MFR BLD HPLC: 5.9 %
HCT VFR BLD CALC: 44.6 %
HGB BLD-MCNC: 13.9 G/DL
IRON SATN MFR SERPL: 33 %
IRON SERPL-MCNC: 119 UG/DL
KETONES URINE: NEGATIVE MG/DL
LEUKOCYTE ESTERASE URINE: NEGATIVE
MCHC RBC-ENTMCNC: 29.4 PG
MCHC RBC-ENTMCNC: 31.2 GM/DL
MCV RBC AUTO: 94.5 FL
MICROSCOPIC-UA: NORMAL
NITRITE URINE: NEGATIVE
PH URINE: 5.5
PHOSPHATE SERPL-MCNC: 3.2 MG/DL
PLATELET # BLD AUTO: 232 K/UL
POTASSIUM SERPL-SCNC: 4.2 MMOL/L
PROT UR-MCNC: 32 MG/DL
PROTEIN URINE: 30 MG/DL
RBC # BLD: 4.72 M/UL
RBC # FLD: 13.5 %
RED BLOOD CELLS URINE: 2 /HPF
REVIEW: NORMAL
SODIUM SERPL-SCNC: 139 MMOL/L
SPECIFIC GRAVITY URINE: 1.03
TIBC SERPL-MCNC: 358 UG/DL
UIBC SERPL-MCNC: 240 UG/DL
UROBILINOGEN URINE: 0.2 MG/DL
WBC # FLD AUTO: 6.15 K/UL
WHITE BLOOD CELLS URINE: 0 /HPF